# Patient Record
Sex: MALE | Race: WHITE
[De-identification: names, ages, dates, MRNs, and addresses within clinical notes are randomized per-mention and may not be internally consistent; named-entity substitution may affect disease eponyms.]

---

## 2017-06-11 ENCOUNTER — HOSPITAL ENCOUNTER (INPATIENT)
Dept: HOSPITAL 74 - JER | Age: 82
LOS: 2 days | Discharge: HOME | DRG: 313 | End: 2017-06-13
Attending: NURSE PRACTITIONER | Admitting: INTERNAL MEDICINE
Payer: COMMERCIAL

## 2017-06-11 VITALS — BODY MASS INDEX: 26.1 KG/M2

## 2017-06-11 DIAGNOSIS — I71.2: ICD-10-CM

## 2017-06-11 DIAGNOSIS — D69.6: ICD-10-CM

## 2017-06-11 DIAGNOSIS — M19.90: ICD-10-CM

## 2017-06-11 DIAGNOSIS — C91.10: ICD-10-CM

## 2017-06-11 DIAGNOSIS — K25.9: ICD-10-CM

## 2017-06-11 DIAGNOSIS — D64.9: ICD-10-CM

## 2017-06-11 DIAGNOSIS — I08.2: ICD-10-CM

## 2017-06-11 DIAGNOSIS — N40.0: ICD-10-CM

## 2017-06-11 DIAGNOSIS — I48.2: ICD-10-CM

## 2017-06-11 DIAGNOSIS — Z85.46: ICD-10-CM

## 2017-06-11 DIAGNOSIS — R07.89: Primary | ICD-10-CM

## 2017-06-11 DIAGNOSIS — I10: ICD-10-CM

## 2017-06-11 DIAGNOSIS — I25.10: ICD-10-CM

## 2017-06-11 DIAGNOSIS — I45.10: ICD-10-CM

## 2017-06-11 LAB
ALBUMIN SERPL-MCNC: 3 G/DL (ref 3.4–5)
ALP SERPL-CCNC: 48 U/L (ref 45–117)
ALT SERPL-CCNC: 12 U/L (ref 12–78)
ANION GAP SERPL CALC-SCNC: 6 MMOL/L (ref 8–16)
APTT BLD: 35.3 SECONDS (ref 26.9–34.4)
AST SERPL-CCNC: 10 U/L (ref 15–37)
BASOPHILS # BLD: 0.4 % (ref 0–2)
BILIRUB SERPL-MCNC: 0.4 MG/DL (ref 0.2–1)
CALCIUM SERPL-MCNC: 8.3 MG/DL (ref 8.5–10.1)
CO2 SERPL-SCNC: 29 MMOL/L (ref 21–32)
COCKROFT - GAULT: 53.41
CREAT SERPL-MCNC: 1 MG/DL (ref 0.7–1.3)
DEPRECATED RDW RBC AUTO: 18.2 % (ref 11.9–15.9)
EOSINOPHIL # BLD: 0.5 % (ref 0–4.5)
GLUCOSE SERPL-MCNC: 100 MG/DL (ref 74–106)
INR BLD: 1.2 (ref 0.82–1.09)
MAGNESIUM SERPL-MCNC: 2.2 MG/DL (ref 1.8–2.4)
MCH RBC QN AUTO: 29.5 PG (ref 25.7–33.7)
MCHC RBC AUTO-ENTMCNC: 31.6 G/DL (ref 32–35.9)
MCV RBC: 93.3 FL (ref 80–96)
NEUTROPHILS # BLD: 64.5 % (ref 42.8–82.8)
PLATELET # BLD AUTO: 63 K/MM3 (ref 134–434)
PLATELET # BLD EST: (no result) 10*3/UL
PLATELET COMMENT2: (no result)
PLATELET COMMENT3: (no result)
PMV BLD: 12.3 FL (ref 7.5–11.1)
PROT SERPL-MCNC: 8 G/DL (ref 6.4–8.2)
PT PNL PPP: 13.3 SEC (ref 9.98–11.88)
TROPONIN I SERPL-MCNC: < 0.02 NG/ML (ref 0–0.05)
WBC # BLD AUTO: 5.1 K/MM3 (ref 4–10)

## 2017-06-11 NOTE — PDOC
History of Present Illness





- General


History Source: Patient, Family, Old Records


Exam Limitations: No Limitations





<Regis Olivares - Last Filed: 06/11/17 18:54>





- General


History Source: Patient, Old Records


Exam Limitations: No Limitations





- History of Present Illness


Initial Comments: 


06/11/17 18:46


The patient is an 86-year-old man, accompanied by his wife and his son, with a 

significant past medical history of lymphoplasmacytic lymphoma/CLL status post 

Rituxan with no response(now on Imbruvica BID), atrial fibrillation (not on 

anticoagulants due to severe thrombocytopenia due to bone marrow involvement of 

LPL/CLL, benign prostate hyperplasia, prostate Ca (status post seed implants 

approximately 12 years ago), coronary artery disease, osteoarthritis who 

presents to the emergency department via walk-in for chest pain. No fall, 

recent strenuous activity. He states he was in his usual state of health when 

he started to experience left sided chest pain, 2 days ago. He is unable to 

describe the nature of the pain but notes that his pain is non-radiating, 

constant with a rated 5/10 in severity and associated with shortness of breath 

and palpitations. He does not provide any exacerbating/alleviating factors, as 

he notes that exertional activity versus nonexertional does not change the 

severity of his pain.  He presents today for evaluation.


 


As per old charts, patient underwent an echocardiogram on 12/11/2015 which 

revealed moderate dilatation of the left atrium and severe dilatation of the 

right atrium, mild mitral annular calcification, mild tricuspid regurgitation, 

mild aortic stenosis, mild to moderate aortic regurgitation, mildly dilated 

ascending aorta, mild left ventricular hypertrophy and moderate dilatation of 

the right ventricle.


 


He denies fever, chills, cough, hemoptysis, diaphoresis, headache.


He denies jaw/ back pain, lower extremity pain/swelling, calf tenderness/pain


He denies abdominal pain, nausea, vomiting, diarrhea.





Allergies: Cefepime


Past Surgical History: Hernia repair. Seeds implant.


Social History: No tobacco, EtOH and recreational drug use.


Primary Care Physician: Dr. Ronni Bryant


Cardiologist: Patient states he saw Dr. Rivas Silvestre approximately 2 

years ago


Oncologist/Hematologist: Dr. Roland Day





<Damaris Dennis - Last Filed: 06/11/17 19:01>





<Juany Nguyễn - Last Filed: 06/11/17 23:47>





- General


Chief Complaint: Chest Pain


Stated Complaint: CHEST PAIN


Time Seen by Provider: 06/11/17 18:06





Past History





- Past Medical History


Anemia: No


Asthma: No


Cancer: Yes (LYMPHOMA)


Cardiac Disorders: Yes (A-fib)


CVA: No


CHF: No


Dementia: No


Diabetes: No


GI Disorders: No


 Disorders: Yes (Enlarged Prostate)


HTN: Yes


Hypercholesterolemia: No


Liver Disease: No


Seizures: No


Thyroid Disease: No





- Surgical History


Abdominal Surgery: Yes (HERNIA REPAIR)


Appendectomy: No


Cardiac Surgery: No


Cholecystectomy: No


Lung Surgery: No


Neurologic Surgery: No


Orthopedic Surgery: No





- Psycho/Social/Smoking Cessation Hx


Anxiety: No


Suicidal Ideation: No


Smoking History: Never smoked


Have you smoked in the past 12 months: No


Number of Cigarettes Smoked Daily: 10


If you are a former smoker, when did you quit?: 50 YRS AGO


Information on smoking cessation initiated: No


Hx Alcohol Use: No


Drug/Substance Use Hx: No


Substance Use Type: None


Hx Substance Use Treatment: No





<Regis Olivares - Last Filed: 06/11/17 18:54>





<Damaris Dennis - Last Filed: 06/11/17 19:01>





<Juany Nguyễn - Last Filed: 06/11/17 23:47>





- Past Medical History


Allergies/Adverse Reactions: 


 Allergies











Allergy/AdvReac Type Severity Reaction Status Date / Time


 


cefepime Allergy   Verified 06/11/17 18:10











Home Medications: 


Ambulatory Orders





Pantoprazole Sodium [Protonix -] 40 mg PO DAILY 10/17/15 


Valacyclovir HCl [Valtrex -] 500 mg PO HS 11/10/15 


Ibrutinib [Imbruvica] 280 mg PO HS 06/11/17 


Tamsulosin HCl [Flomax] 0.4 mg PO HS 06/11/17 











**Review of Systems





- Review of Systems


Able to Perform ROS?: Yes


Comments:: 


06/11/17 18:46


GENERAL/CONSTITUTIONAL: No fever or chills. No weakness.


HEAD, EYES, EARS, NOSE AND THROAT: No change in vision. No ear pain or 

discharge. No sore throat.


CARDIOVASCULAR: Yes: Chest Pain. Shortness of Breath. Palpitations. 


RESPIRATORY: No cough, wheezing, or hemoptysis.


GASTROINTESTINAL: No nausea, vomiting, diarrhea or constipation.


GENITOURINARY: No dysuria, frequency, or change in urination.


MUSCULOSKELETAL: No joint or muscle swelling or pain. No neck or back pain.


SKIN: No rash


NEUROLOGIC: No headache, vertigo, loss of consciousness, or change in strength/

sensation.


ENDOCRINE: No increased thirst. No abnormal weight change.


HEMATOLOGIC/LYMPHATIC: Yes: See HPI.


ALLERGIC/IMMUNOLOGIC: No hives or skin allergy.





<Damaris Dennis - Last Filed: 06/11/17 19:01>





*Physical Exam





- Vital Signs


 Last Vital Signs











Temp Pulse Resp BP Pulse Ox


 


 97.0 F L  82   18   142/79   100 


 


 06/11/17 18:04  06/11/17 18:04  06/11/17 18:04  06/11/17 18:04  06/11/17 18:04














<Regis Olivares - Last Filed: 06/11/17 18:54>





- Vital Signs


 Last Vital Signs











Temp Pulse Resp BP Pulse Ox


 


 97.0 F L  82   18   142/79   100 


 


 06/11/17 18:04  06/11/17 18:04  06/11/17 18:04  06/11/17 18:04  06/11/17 18:04














- Physical Exam


Comments: 


06/11/17 18:46


GENERAL: Awake, alert, and fully oriented, in no acute distress 


HEAD: No signs of trauma


EYES: PERRLA, EOMI, sclera anicteric, conjunctiva clear


ENT: Auricles normal inspection, hearing grossly normal, nares patent, 

oropharynx clear without exudates. Moist mucosa


NECK: Normal ROM, supple, no lymphadenopathy, JVD, or masses


LUNGS: Rales appreciated approximately half way up the left lung. Rales also 

appreciated at the base of the right lung.


HEART:  Irregularly, irregular rate and rhythm. No murmurs, rubs or gallops


ABDOMEN: Soft, nontender, normoactive bowel sounds.  No guarding, no rebound.  

No masses


EXTREMITIES: Normal range of motion, no edema.  No clubbing or cyanosis. No 

cords, erythema, or tenderness


NEUROLOGICAL: Cranial nerves II through XII grossly intact.  Normal speech, 

normal gait








<Damaris Dennis - Last Filed: 06/11/17 19:01>





- Vital Signs


 Last Vital Signs











Temp Pulse Resp BP Pulse Ox


 


 97.0 F L  82   18   142/79   100 


 


 06/11/17 18:04  06/11/17 18:04  06/11/17 18:04  06/11/17 18:04  06/11/17 18:04














<Juany Nguyễn - Last Filed: 06/11/17 23:47>





**Heart Score/ECG Review





- History


History: Moderately suspicious





- Electrocardiogram


EKG: Non specific repolarization disturbance





- Age


Age: >/= 65





- Risk Factors


Based on the list above the patient has:: 1-2 risk factors


  ** #1


ECG reviewed & interpreted by me at: 18:10





06/11/17 18:37


afib 132, RBBB,  msec, no GATO





<Regis Olivares - Last Filed: 06/11/17 18:54>





ED Treatment Course





- LABORATORY


CBC & Chemistry Diagram: 


 06/11/17 18:40





 06/11/17 18:40





- RADIOLOGY


Radiology Studies Ordered: 














 Category Date Time Status


 


 CHEST CTA [CT] Stat CT Scan  06/11/17 18:24 Ordered














<Regis Olivares - Last Filed: 06/11/17 18:54>





- LABORATORY


CBC & Chemistry Diagram: 


 06/11/17 18:40





 06/11/17 18:40





<Damaris Dennis - Last Filed: 06/11/17 19:01>





- LABORATORY


CBC & Chemistry Diagram: 


 06/11/17 19:00





 06/11/17 19:00





- ADDITIONAL ORDERS


Additional order review: 


 Laboratory  Results











  06/11/17 06/11/17





  19:00 19:00


 


INR   1.20 H


 


PTT (Actin FS)   35.3 H


 


Sodium  137 


 


Potassium  4.5 


 


Chloride  102 


 


Carbon Dioxide  29 


 


Anion Gap  6 L 


 


BUN  19 H 


 


Creatinine  1.0 


 


Creat Clearance w eGFR  > 60 


 


Random Glucose  100 


 


Calcium  8.3 L 


 


Magnesium  2.2  D 


 


Total Bilirubin  0.4 


 


AST  10 L D 


 


ALT  12  D 


 


Alkaline Phosphatase  48 


 


Creatine Kinase  27 L 


 


Troponin I  < 0.02 


 


B-Natriuretic Peptide  552.36 H 


 


Total Protein  8.0  D 


 


Albumin  3.0 L 








 











  06/11/17





  19:00


 


RBC  3.15 L


 


MCV  93.3


 


MCHC  31.6 L


 


RDW  18.2 H D


 


MPV  12.3 H


 


Neutrophils %  64.5


 


Lymphocytes %  23.3


 


Monocytes %  11.3 H


 


Eosinophils %  0.5  D


 


Basophils %  0.4














- Medications


Given in the ED: 


ED Medications














Discontinued Medications














Generic Name Dose Route Start Last Admin





  Trade Name Arjun  PRN Reason Stop Dose Admin


 


Aspirin  324 mg 06/11/17 18:38 06/11/17 19:11





  Asa -  PO 06/11/17 18:39  324 mg





  ONCE ONE   Administration














<Juany Nguyễn - Last Filed: 06/11/17 23:47>





Medical Decision Making





- Medical Decision Making


06/11/17 18:36





A portion of this note was documented by scribe services under my direction. I 

have reviewed the details of the note, within reason, and agree with the 

documentation with the following case summary and management plan written by 

me. 





Patient treated in the ED.





Nursing notes are reviewed and incorporated into the medical decision-making.


Vital signs reviewed.





Peripheral IV access obtained by the nurse, laboratory studies are drawn and 

sent, reviewed and interpreted by myself. 





 Vital Signs











Temp Pulse Resp BP Pulse Ox


 


 97.0 F L  82   18   142/79   100 


 


 06/11/17 18:04  06/11/17 18:04  06/11/17 18:04  06/11/17 18:04  06/11/17 18:04








86 year old male pmh of afib (not on anticoagulation), LISSA lymphoma, HTN, BPH, 

distant prostate ca, HLD presents with 3 days of chest pain. The patient has 

persistent difficult to describe left-sided chest pain with no radiation or 

shortness of breath. The patient reports that he's been adherent to his 

medications including oral chemotherapy that he takes daily. Denies fevers or 

chills.





Differential includes myocardial infarction, acute coronary syndrome, pulmonary 

embolism, left pleural effusion. Regardless, patient should be admitted for 

further evaluation. Labs, CTA of chest to r/o PE, troponin.





06/11/17 18:54


Case signed out to oncoming ED attending Dr. Nguyễn for further management and 

disposition.





<Regis Olivares - Last Filed: 06/11/17 18:54>





- Medical Decision Making





06/11/17 23:39


I received pt on signout and his CT was finally read now.  He has an aortic 

aneurysm. No PE; just pleural effusions:





Patient Name: Kareem Josue THIS IS A PRELIMINARY REPORT FROM IMAGING ON CALL

  EXAM: CTA chest  IMAGES: [id number]  EXAM DATE AND TIME: 2017-06-11 20:29:15.0  

REASON FOR EXAM: Chest pain. Rule out PE. History of lymphoma  COMPARISON: Not 

provided  FINDINGS: There is no evidence of pulmonary embolism  The thoracic 

aorta is tortuous with atherosclerotic calcifications. There is aneurysmal 

dilatation of the ascending aorta measuring 4.3 x 4.2 cm in greatest transverse 

dimension. No evidence of dissection  The heart is upper limits of normal size 

to mildly enlarged. There are coronary artery and aortic annular 

calcifications. Trace pericardial effusion  There are enlarged prevascular and 

paratracheal lymph nodes and enlarged bilateral hilar lymph nodes measuring up 

to 1 cm in short axis. These are nonspecific and possibly reactive although 

lymphoproliferative disease and malignant adenopathy cannot be excluded  The 

tracheobronchial tree is patent  Small bilateral pleural effusions with 

overlying atelectasis, slightly larger on the right side Pleural thickening at 

the lung apices No airspace consolidation or infiltrates  Few hepatic cysts 

measure up to 1.5 cm. Imaged portions of the upper abdomen are otherwise 

unremarkable THIS DOCUMENT HAS BEEN ELECTRONICALLY SIGNED





He will be admitted to the hospitalist





<Juany Nguyễn - Last Filed: 06/11/17 23:47>





*DC/Admit/Observation/Transfer





<Regis Olivares - Last Filed: 06/11/17 18:54>





- Attestations


Scribe Attestion: 


06/11/17 18:47


Documentation prepared by Damaris Dennis, acting as medical scribe for Regis Olivares MD.





<Damaris Dennis - Last Filed: 06/11/17 19:01>





- Discharge Dispostion


Admit: Yes





<Juany Nguyễn - Last Filed: 06/11/17 23:47>


Diagnosis at time of Disposition: 


 Chest pain, A-fib, CLL (chronic lymphocytic leukemia), Aortic aneurysm

## 2017-06-11 NOTE — HP
CHIEF COMPLAINT: Left Sided Chest Pain





PCP: Dr. Ronni Bryant


Card: Dr. Silvestre


Heme/Onc: Dr. Day





HISTORY OF PRESENT ILLNESS:


This is a pleasant 85 y/o man with a significant past medical history of: CLL/

Lymphoplasmacytic Lymphoma (on Imbru, Prostate Ca (seed implants), HTN, CAD,  

Afib ( no AC), Thrombocytopenia, OA, BPH, Gastric Ulcer. Who presents to the ED 

with L-sided chest pain x 2 days. Patient reports the pain as constant, non-

radiating. Patient denies fever, chills, cough, AP, N/V/D, constipation, 

dysuria. Patient's last Echo 12/15.





ER course was notable for:


(1) Cardiac Enzymes neg x1


(2) EKG-Afib rate controlled, RBBB


(3) H/H 9.3/29.4


(4) Platelets 63





Recent Travel: None





PAST MEDICAL HISTORY:


See HPI





PAST SURGICAL HISTORY:


Hernia Repair


Prostate (seed implant)





Social History:


Smoking: Never


Alcohol:  None


Drugs:   None


Lives with spouse, independent





Family History:


Cancer- Son





Allergies





cefepime Allergy (Verified 06/11/17 18:10)


 








HOME MEDICATIONS:


 Home Medications











 Medication  Instructions  Recorded


 


Pantoprazole Sodium [Protonix -] 40 mg PO DAILY 10/17/15


 


Valacyclovir HCl [Valtrex -] 500 mg PO HS 11/10/15


 


Ibrutinib [Imbruvica] 280 mg PO HS 06/11/17


 


Tamsulosin HCl [Flomax] 0.4 mg PO HS 06/11/17








REVIEW OF SYSTEMS


CONSTITUTIONAL: 


Absent:  fever, chills, diaphoresis, generalized weakness, malaise, loss of 

appetite, weight change


HEENT: 


Absent:  rhinorrhea, nasal congestion, throat pain, throat swelling, difficulty 

swallowing, mouth swelling, ear pain, eye pain, visual changes


CARDIOVASCULAR: chest pain


Absent: syncope, palpitations, irregular heart rate, lightheadedness, 

peripheral edema


RESPIRATORY: 


Absent: cough, shortness of breath, dyspnea with exertion, orthopnea, wheezing, 

stridor, hemoptysis


GASTROINTESTINAL:


Absent: abdominal pain, abdominal distension, nausea, vomiting, diarrhea, 

constipation, melena, hematochezia


GENITOURINARY: 


Absent: dysuria, frequency, urgency, hesitancy, hematuria, flank pain, genital 

pain


MUSCULOSKELETAL: 


Absent: myalgia, arthralgia, joint swelling, back pain, neck pain


SKIN: 


Absent: rash, itching, pallor


HEMATOLOGIC/IMMUNOLOGIC: 


Absent: easy bleeding, easy bruising, lymphadenopathy, frequent infections


ENDOCRINE:


Absent: unexplained weight gain, unexplained weight loss, heat intolerance, 

cold intolerance


NEUROLOGIC: 


Absent: headache, focal weakness or paresthesias, dizziness, unsteady gait, 

seizure, mental status changes, bladder or bowel incontinence


PSYCHIATRIC: 


Absent: anxiety, depression, suicidal or homicidal ideation, hallucinations.








PHYSICAL EXAMINATION


 Vital Signs - 24 hr











  06/11/17





  18:04


 


Temperature 97.0 F L


 


Pulse Rate 82


 


Respiratory 18





Rate 


 


Blood Pressure 142/79


 


O2 Sat by Pulse 100





Oximetry (%) 











GENERAL: Awake, alert, and fully oriented, in no acute distress.


HEAD: Normal with no signs of trauma.


EYES: Pupils equal, round and reactive to light, extraocular movements intact, 

sclera anicteric, conjunctiva clear. No lid lag.


EARS, NOSE, THROAT: Ears normal, nares patent, oropharynx clear without 

exudates. Moist mucous membranes.


NECK: Normal range of motion, supple without lymphadenopathy, JVD, or masses.


LUNGS: Breath sounds equal, clear to auscultation bilaterally. No wheezes, and 

no crackles. No accessory muscle use.


HEART: Irregular rate and rhythm, normal S1 and S2, systolic murmur. No rub or 

gallop. CP is non- reproducible to palpation


ABDOMEN: Soft, nontender, not distended, normoactive bowel sounds, no guarding, 

no rebound, no masses.  No hepatomegaly or  splenomegaly. 


MUSCULOSKELETAL: Normal range of motion at all joints. No bony deformities or 

tenderness. No CVA tenderness.


UPPER EXTREMITIES: 2+ pulses, warm, well-perfused. No cyanosis. No clubbing. No 

peripheral edema.


LOWER EXTREMITIES: 2+ pulses, warm, well-perfused. No calf tenderness. No 

peripheral edema. 


NEUROLOGICAL:  Cranial nerves II-XII intact. Normal speech. Gait not observed.


PSYCHIATRIC: Cooperative. Good eye contact. Appropriate mood and affect.


SKIN: Warm, dry, normal turgor, no rashes or lesions noted, normal capillary 

refill. 








 Laboratory Results - last 24 hr











  06/11/17 06/11/17 06/11/17





  19:00 19:00 19:00


 


WBC  5.1  


 


RBC  3.15 L  


 


Hgb  9.3 L D  


 


Hct  29.4 L D  


 


MCV  93.3  


 


MCHC  31.6 L  


 


RDW  18.2 H D  


 


Plt Count  63 L  


 


MPV  12.3 H  


 


Neutrophils %  64.5  


 


Lymphocytes %  23.3  


 


Monocytes %  11.3 H  


 


Eosinophils %  0.5  D  


 


Basophils %  0.4  


 


Differential Comment  Slide scanned  


 


Platelet Estimate  Mod decreased  


 


Platelet Comment  Few giant plts  


 


INR   1.20 H 


 


PTT (Actin FS)   35.3 H 


 


Sodium    137


 


Potassium    4.5


 


Chloride    102


 


Carbon Dioxide    29


 


Anion Gap    6 L


 


BUN    19 H


 


Creatinine    1.0


 


Creat Clearance w eGFR    > 60


 


Random Glucose    100


 


Calcium    8.3 L


 


Magnesium    2.2  D


 


Total Bilirubin    0.4


 


AST    10 L D


 


ALT    12  D


 


Alkaline Phosphatase    48


 


Creatine Kinase    27 L


 


Troponin I    < 0.02


 


B-Natriuretic Peptide    552.36 H


 


Total Protein    8.0  D


 


Albumin    3.0 L











ASSESSMENT/PLAN:


This is a 85 y/o male with a PMHx of: CLL, Afib (no AC), HTN, CAD, Prostate CA, 

BPH, OA, Gastric Ulcer. Admitted to Telemetry for Chest Pain r/o MI, 

Thrombocytopenia, Anemia for further evaluation of their emergent condition.





Plan:





1. Chest Pain r/o MI


- Tele monitoring


- HEART Score 4


- Asa given in ED


- Serial Enzymes, neg x1


- Appreciate Cardiology Consult


- EKG reviewed no change compared to prior study


- Chest Xray- reviewed


- Echo in am


- Last Echo 2 yrs ago showed- EF 78% moderate dilation LA, severe dilation RA, 

mild TR, AS, mild-mod AR, mild dilated ascending aorta, LVSF normal, RV, mod 

dilated, RV systolic function normal


- Lipid Profile am


- HgbA1C


- Continue Baby Asa closely monitor 





2. Afib


- Rate Controlled


- Tele monitoring


- [id number]  Score 3


- Patient not on ACs secondary to risks outweigh the benefits, for severe 

Thrombocytopenia given the CLL hx


- EKG reviewed





3. Chronic Lymphocytic Leukemia


- Continue Imbruvica, (NF), patient may use his home, when reviewed by pharmacy


- f/u with Oncology outpatient





4. Thrombocytopenia


- Likely secondary to CLL


- Will continue to monitor and treat accordingly





5. Hypertension


- Controlled


- Monitor BP, treat accordingly


- Currently not on meds





6. BPH


- Continue Flomax





7. Gastric Ulcer


- Continue Protonix





8. Osteoarthritis


- Tylenol prn





9. Prostate Ca


- s/p seed implant x9 yrs ago





10. FEN


- Tolerates PO Fluids


- Replete lytes prn


- Low Na Diet





11. DVT Prophylaxis


- OOB


- SCDs


- Hold Ac 2/2 Thrombocytopenia





Code Status: Full Code





Dispo: Requires Inpatient Care











Problem List





- Problem


(1) Atrial fibrillation, chronic


Code(s): I48.2 - CHRONIC ATRIAL FIBRILLATION





(2) CLL (chronic lymphocytic leukemia)


Code(s): C91.10 - CHRONIC LYMPHOCYTIC LEUK OF B-CELL TYPE NOT ACHIEVE REMIS





(3) Chest pain


Code(s): R07.9 - CHEST PAIN, UNSPECIFIED   





(4) DVT prophylaxis


Code(s): VRL5316 - 





(5) Hypertension


Code(s): I10 - ESSENTIAL (PRIMARY) HYPERTENSION   Qualifiers: 


     Hypertension type: essential hypertension     Qualified Code(s): I10 - 

Essential (primary) hypertension  








Visit type





- Emergency Visit


Emergency Visit: Yes


ED Registration Date: 06/11/17


Care time: The patient presented to the Emergency Department on the above date 

and was hospitalized for further evaluation of their emergent condition.





- New Patient


This patient is new to me today: Yes


Date on this admission: 06/11/17





- Critical Care


Critical Care patient: No

## 2017-06-12 LAB
ANION GAP SERPL CALC-SCNC: 6 MMOL/L (ref 8–16)
BASOPHILS # BLD: 0.6 % (ref 0–2)
CALCIUM SERPL-MCNC: 8.5 MG/DL (ref 8.5–10.1)
CHOLEST SERPL-MCNC: 91 MG/DL (ref 50–200)
CO2 SERPL-SCNC: 30 MMOL/L (ref 21–32)
COCKROFT - GAULT: 59.34
CREAT SERPL-MCNC: 0.9 MG/DL (ref 0.7–1.3)
DEPRECATED RDW RBC AUTO: 17.6 % (ref 11.9–15.9)
EOSINOPHIL # BLD: 0.6 % (ref 0–4.5)
GLUCOSE SERPL-MCNC: 84 MG/DL (ref 74–106)
LDLC SERPL CALC-MCNC: 40 MG/DL (ref 5–100)
MAGNESIUM SERPL-MCNC: 2.3 MG/DL (ref 1.8–2.4)
MCH RBC QN AUTO: 29.7 PG (ref 25.7–33.7)
MCHC RBC AUTO-ENTMCNC: 31.7 G/DL (ref 32–35.9)
MCV RBC: 93.6 FL (ref 80–96)
NEUTROPHILS # BLD: 61.1 % (ref 42.8–82.8)
PHOSPHATE SERPL-MCNC: 3.6 MG/DL (ref 2.5–4.9)
PLATELET # BLD AUTO: 74 K/MM3 (ref 134–434)
PMV BLD: 12.5 FL (ref 7.5–11.1)
TROPONIN I SERPL-MCNC: < 0.02 NG/ML (ref 0–0.05)
TROPONIN I SERPL-MCNC: < 0.02 NG/ML (ref 0–0.05)
WBC # BLD AUTO: 5.7 K/MM3 (ref 4–10)

## 2017-06-12 RX ADMIN — ASPIRIN 81 MG SCH MG: 81 TABLET ORAL at 10:20

## 2017-06-12 RX ADMIN — PANTOPRAZOLE SODIUM SCH MG: 40 TABLET, DELAYED RELEASE ORAL at 10:20

## 2017-06-12 NOTE — CON.CARD
Consult


Consult Specialty:: Cardiology





- History of Present Illness


History of Present Illness: 


The patient is an 86-year-old man, accompanied by his wife and his son, with a 

significant past medical history of lymphoplasmacytic lymphoma/CLL status post 

Rituxan with no response(now on Imbruvica BID), atrial fibrillation (not on 

anticoagulants due to severe thrombocytopenia due to bone marrow involvement of 

LPL/CLL, benign prostate hyperplasia, prostate Ca (status post seed implants 

approximately 12 years ago), coronary artery disease, osteoarthritis who 

presents to the emergency department via walk-in for chest pain. No fall, 

recent strenuous activity. He states he was in his usual state of health when 

he started to experience left sided chest pain, 2 days ago. He is unable to 

describe the nature of the pain but notes that his pain is non-radiating, 

constant with a rated 5/10 in severity and associated with shortness of breath 

and palpitations. He does not provide any exacerbating/alleviating factors, as 

he notes that exertional activity versus nonexertional does not change the 

severity of his pain.  He presents today for evaluation.


 


As per old charts, patient underwent an echocardiogram on 12/11/2015 which 

revealed moderate dilatation of the left atrium and severe dilatation of the 

right atrium, mild mitral annular calcification, mild tricuspid regurgitation, 

mild aortic stenosis, mild to moderate aortic regurgitation, mildly dilated 

ascending aorta, mild left ventricular hypertrophy and moderate dilatation of 

the right ventricle.


 


He denies fever, chills, cough, hemoptysis, diaphoresis, headache.


He denies jaw/ back pain, lower extremity pain/swelling, calf tenderness/pain


He denies abdominal pain, nausea, vomiting, diarrhea.





Allergies: Cefepime


Past Surgical History: Hernia repair. Seeds implant.


Social History: No tobacco, EtOH and recreational drug use.


Primary Care Physician: Dr. Ronni Bryant


Cardiologist: Patient states he saw Dr. Rivas Silvestre approximately 2 

years ago


Oncologist/Hematologist: Dr. Roland Day








[facility]





Atrial fibrillation             


   Hypertension             


   Lymphoma       Dr. Day       


   [facility]    





- History Source


History Provided By: Patient





- Past Medical History


Cardio/Vascular: Yes: AFIB (chronic), HTN


Pulmonary: Yes: Other (?describes history of asbestosis. )


Gastrointestinal: Yes: Other


Renal/: Yes: BPH





- Alcohol/Substance Use


Hx Alcohol Use: No


History of Substance Use: reports: None





- Smoking History


Smoking history: Never smoked


Have you smoked in the past 12 months: No


Aproximately how many cigarettes per day: 10


If you are a former smoker, when did you quit?: 50 YRS AGO





- Social History


Usual Living Arrangement: With Spouse


ADL: Independent


History of Recent Travel: No





Home Medications





- Allergies


Allergies/Adverse Reactions: 


 Allergies











Allergy/AdvReac Type Severity Reaction Status Date / Time


 


cefepime Allergy   Verified 06/11/17 18:10














- Home Medications


Home Medications: 


Ambulatory Orders





Pantoprazole Sodium [Protonix -] 40 mg PO DAILY 10/17/15 


Valacyclovir HCl [Valtrex -] 500 mg PO HS 11/10/15 


Ibrutinib [Imbruvica] 280 mg PO HS 06/11/17 


Tamsulosin HCl [Flomax] 0.4 mg PO HS 06/11/17 











Review of Systems





- Review of Systems


Constitutional: reports: No Symptoms


Eyes: reports: No Symptoms


HENT: reports: No Symptoms


Neck: reports: No Symptoms


Cardiovascular: reports: Chest Pain


Gastrointestinal: reports: No Symptoms


Genitourinary: reports: No Symptoms


Breasts: reports: No Symptoms Reported


Musculoskeletal: reports: No Symptoms


Integumentary: reports: No Symptoms


Neurological: reports: No Symptoms


Endocrine: reports: No Symptoms


Hematology/Lymphatic: reports: No Symptoms


Psychiatric: reports: No Symptoms


Vital Signs: 


 Vital Signs











Temperature  97.0 F L  06/11/17 18:04


 


Pulse Rate  75   06/12/17 12:04


 


Respiratory Rate  18   06/12/17 07:28


 


Blood Pressure  151/75   06/12/17 12:04


 


O2 Sat by Pulse Oximetry (%)  98   06/12/17 12:04











Constitutional: Yes: Well Nourished, No Distress, Calm


Eyes: Yes: WNL, Conjunctiva Clear, EOM Intact


HENT: Yes: WNL, Atraumatic, Normocephalic


Neck: Yes: WNL, Supple, Trachea Midline


Respiratory: Yes: WNL, Regular, CTA Bilaterally


Gastrointestinal: Yes: WNL, Normal Bowel Sounds


Renal/: Yes: WNL


Cardiovascular: Yes: WNL, Regular Rate and Rhythm


Musculoskeletal: Yes: WNL


Extremities: Yes: WNL


Integumentary: Yes: WNL


Neurological: Yes: WNL, Alert, Oriented


...Motor Strength: WNL


Psychiatric: Yes: WNL, Alert, Oriented





- Other Data


Labs, Other Data: 


 CBC, BMP





 06/12/17 06:00 





 06/12/17 06:35 





 INR, PTT











INR  1.20  (0.82-1.09)  H  06/11/17  19:00    








 Troponin, BNP











  06/12/17 06/12/17





  01:15 06:35


 


Troponin I  < 0.02  < 0.02








 Troponin, BNP











  06/12/17 06/12/17





  01:15 06:35


 


Troponin I  < 0.02  < 0.02














Imaging





- Results


Chest X-ray: Pending, Image Reviewed


Cat Scan: Report Reviewed (4.4 ascending aortic aneuysm)


EKG: Image Reviewed (af RBBB)





Problem List





- Problems


(1) A-fib


Code(s): I48.91 - UNSPECIFIED ATRIAL FIBRILLATION   





(2) Abscess or cellulitis of chest wall


Code(s): SOL0068 - 





(3) Acute blood loss anemia


Code(s): D62 - ACUTE POSTHEMORRHAGIC ANEMIA





(4) Admission for blood transfusion, without reported diagnosis


Code(s): Z51.89 - ENCOUNTER FOR OTHER SPECIFIED AFTERCARE





(5) Amaurosis fugax


Code(s): G45.3 - AMAUROSIS FUGAX





(6) Anemia


Code(s): D64.9 - ANEMIA, UNSPECIFIED   





(7) Aortic aneurysm


Code(s): I71.9 - AORTIC ANEURYSM OF UNSPECIFIED SITE, WITHOUT RUPTURE   





(8) Atrial fibrillation, chronic


Code(s): I48.2 - CHRONIC ATRIAL FIBRILLATION





(9) CHF exacerbation


Code(s): I50.9 - HEART FAILURE, UNSPECIFIED   





(10) CLL (chronic lymphocytic leukemia)


Code(s): C91.10 - CHRONIC LYMPHOCYTIC LEUK OF B-CELL TYPE NOT ACHIEVE REMIS





(11) Chest pain


Code(s): R07.9 - CHEST PAIN, UNSPECIFIED   





(12) DVT prophylaxis


Code(s): [id number] - 





(13) Decreased vision


Code(s): H54.7 - UNSPECIFIED VISUAL LOSS





(14) Headache


Code(s): R51 - HEADACHE   Qualifiers: 


     Headache type: unspecified     Headache chronicity pattern: acute headache

     Intractability: not intractable     Qualified Code(s): R51 - Headache  





(15) Hypertension


Code(s): I10 - ESSENTIAL (PRIMARY) HYPERTENSION   Qualifiers: 


     Hypertension type: essential hypertension     Qualified Code(s): I10 - 

Essential (primary) hypertension  





(16) Hyperviscosity syndrome


Code(s): [id number] - 





(17) Hyponatremia


Code(s): E87.1 - HYPO-OSMOLALITY AND HYPONATREMIA





(18) LVH (left ventricular hypertrophy)


Code(s): I51.7 - CARDIOMEGALY





(19) Leg pain, right


Code(s): M79.604 - PAIN IN RIGHT LEG





(20) Mucositis due to chemotherapy


Code(s): K12.31 - ORAL MUCOSITIS (ULCERATIVE) DUE TO ANTINEOPLASTIC THERAPY





(21) Nasal bleeding


Code(s): R04.0 - EPISTAXIS





(22) Nasal crusting


Code(s): J34.89 - OTHER SPECIFIED DISORDERS OF NOSE AND NASAL SINUSES





(23) Pancytopenia


Code(s): D61.818 - OTHER PANCYTOPENIA





(24) Pancytopenia, acquired


Code(s): D61.818 - OTHER PANCYTOPENIA





(25) Sebaceous cyst


Code(s): L72.3 - SEBACEOUS CYST





(26) Temporal arteritis syndrome


Code(s): M31.6 - OTHER GIANT CELL ARTERITIS





(27) Tremor


Code(s): R25.1 - TREMOR, UNSPECIFIED





(28) Back pain


Code(s): M54.9 - DORSALGIA, UNSPECIFIED   Qualifiers: 


     Back pain location: low back pain     Back pain laterality: right     

Sciatica presence: without sciatica     Qualified Code(s): M54.5 - Low back 

pain  





(29) Elbow contusion


Code(s): S50.00XA - CONTUSION OF UNSPECIFIED ELBOW, INITIAL ENCOUNTER   





(30) GI bleed


Code(s): K92.2 - GASTROINTESTINAL HEMORRHAGE, UNSPECIFIED   





(31) Thrombocytopenia


Code(s): D69.6 - THROMBOCYTOPENIA, UNSPECIFIED








Assessment/Plan


Atypical CP


Atrial fibrillation             


Hypertension             


Lymphoma       


RBBB


anemia


thoracic aortic aneurysm 





plan





telemetry


r/o mi


f/u echo results

## 2017-06-12 NOTE — EKG
Test Reason : 

Blood Pressure : ***/*** mmHG

Vent. Rate : 086 BPM     Atrial Rate : 087 BPM

   P-R Int : 000 ms          QRS Dur : 132 ms

    QT Int : 376 ms       P-R-T Axes : 056 093 016 degrees

   QTc Int : 449 ms

 

ATRIAL FIBRILLATION

RIGHT BUNDLE BRANCH BLOCK

ABNORMAL ECG

WHEN COMPARED WITH ECG OF 13-APR-2016 19:04,

NO SIGNIFICANT CHANGE WAS FOUND

Confirmed by SHAYE MARIE MD ([id number]) on 6/12/2017 2:07:22 PM

 

Referred By:             Confirmed By:SHAYE MARIE MD

## 2017-06-12 NOTE — PN
Physical Exam: 


SUBJECTIVE: Patient seen and examined in the ER.


He denies chest pain during assessment, states he was gardening and may have 

had lifted heavy items.





OBJECTIVE:


 Vital Signs











 Period  Temp  Pulse  Resp  BP Sys/Garcia  Pulse Ox


 


 Last 24 Hr    66-83  18-18  129-151/75-90  96-99











GENERAL: The patient is awake, alert, and fully oriented, in no acute distress.


HEAD: Normal with no signs of trauma.


EYES: PERRL, extraocular movements intact, sclera anicteric, conjunctiva clear. 

No ptosis. 


ENT: Ears normal, nares patent, oropharynx clear without exudates, moist mucous 

membranes.


NECK: Trachea midline, full range of motion, supple. 


LUNGS: Breath sounds equal, clear to auscultation bilaterally, no wheezes, no 

crackles, no accessory muscle use. 


ABDOMEN: Soft, nontender, nondistended, normoactive bowel sounds, no guarding, 

no 


rebound, no hepatosplenomegaly, no masses.


EXTREMITIES: 2+ pulses, warm, well-perfused, no edema. 


NEUROLOGICAL:  Normal speech, gait not observed.


PSYCH: Normal mood, normal affect.


SKIN: Warm, dry, normal turgor, no rashes or lesions noted


 Laboratory Results - last 24 hr











  06/12/17 06/12/17 06/12/17





  01:15 06:00 06:30


 


WBC   5.7 


 


RBC   3.13 L 


 


Hgb   9.3 L 


 


Hct   29.3 L 


 


MCV   93.6 


 


MCHC   31.7 L 


 


RDW   17.6 H 


 


Plt Count   74 L 


 


MPV   12.5 H 


 


Neutrophils %   61.1 


 


Lymphocytes %   28.0  D 


 


Monocytes %   9.7 


 


Eosinophils %   0.6 


 


Basophils %   0.6 


 


Sodium   


 


Potassium   


 


Chloride   


 


Carbon Dioxide   


 


Anion Gap   


 


BUN   


 


Creatinine   


 


Random Glucose   


 


Hemoglobin A1c %    5.4


 


Calcium   


 


Phosphorus   


 


Magnesium   


 


Creatine Kinase  26 L  


 


Troponin I  < 0.02  


 


Triglycerides   


 


Cholesterol   


 


Total LDL Cholesterol   


 


HDL Cholesterol   














  06/12/17 06/12/17





  06:35 06:35


 


WBC  


 


RBC  


 


Hgb  


 


Hct  


 


MCV  


 


MCHC  


 


RDW  


 


Plt Count  


 


MPV  


 


Neutrophils %  


 


Lymphocytes %  


 


Monocytes %  


 


Eosinophils %  


 


Basophils %  


 


Sodium  137 


 


Potassium  4.1 


 


Chloride  101 


 


Carbon Dioxide  30 


 


Anion Gap  6 L 


 


BUN  17 


 


Creatinine  0.9 


 


Random Glucose  84 


 


Hemoglobin A1c %  


 


Calcium  8.5 


 


Phosphorus  3.6 


 


Magnesium  2.3 


 


Creatine Kinase   31 L


 


Troponin I   < 0.02


 


Triglycerides  38 


 


Cholesterol  91 


 


Total LDL Cholesterol  40 


 


HDL Cholesterol  46  D 








Active Medications











Generic Name Dose Route Start Last Admin





  Trade Name Freq  PRN Reason Stop Dose Admin


 


Acetaminophen  650 mg 06/12/17 08:27  





  Tylenol -  PO   





  Q6H PRN   





  FEVER OR PAIN   


 


Aspirin  81 mg 06/12/17 10:00 06/12/17 10:20





  Asa -  PO   81 mg





  DAILY DORA   Administration


 


Morphine Sulfate  0.5 mg 06/12/17 08:28  





  Morphine Injection -  IVPB   





  Q4H PRN   





  PAIN   


 


Non-Formulary Medication  280 mg 06/12/17 22:00  





  Ibrutinib [Imbruvica]  PO   





  HS DORA   


 


Pantoprazole Sodium  40 mg 06/12/17 10:00 06/12/17 10:20





  Protonix -  PO   40 mg





  DAILY DORA   Administration


 


Tamsulosin HCl  0.4 mg 06/12/17 22:00  





  Flomax -  PO   





  HS DORA   


 


Valacyclovir HCl  500 mg 06/12/17 22:00  





  Valtrex -  PO 06/13/17 21:59  





  HS [facility]   











ASSESSMENT/PLAN:





Patient is is a 87 y/o male with a past medical history of CLL, Atrial 

fibrillation (not on any anticoagulants), hypertension, CAD, prostate cancer, 

BPH and gastric ulcer.  He was admitted on 6/11/2017 for chest pain to rule out 

ACS.  He was also noted to have thrombocytopenia and anemia on admission. 





Cardiology:


Chest Pain r/o ACS


Assessment/Plan: On telemonitoring


Denies chest pain on exam


ASA 324mg given in the ER, now on ASA 81mg standing


Trops negative x 3


Cardiology following


Echo pending


Lipid panel reviewed


Monitor on Tele





Atrial Fibrillation


Assessment/Plan: rate controlled


Continue tele monitoring


Not on any anticoagulation secondary to chronic thrombocytopenia 





Hypertension


Assessment/Plan:  Monitor BP


On no cardiac meds, BP slightly elevated


Monitor and if still elevated, will start on Norvasc 5mg


Cardiology following





Oncology:


CLL 


Assessment/Plan: On Imbruvica


Oncology follow up on discharge





Hematology:


Thrombocytopenia - chronic


Assessment/Plan: Likely secondary to CLL





F.E.N.


Fluids: tolerating PO


Electrolytes: monitor with morning bmp


Nutrition: low sodium





Prophylaxis:


DVT: no AC, secondary to thrombocytopenia


GI: deferred





Disposition:  Requires inpatient observation.  Full Code.

## 2017-06-13 VITALS — TEMPERATURE: 98.2 F | HEART RATE: 80 BPM | DIASTOLIC BLOOD PRESSURE: 90 MMHG | SYSTOLIC BLOOD PRESSURE: 148 MMHG

## 2017-06-13 LAB
ALBUMIN SERPL-MCNC: 2.6 G/DL (ref 3.4–5)
ALP SERPL-CCNC: 45 U/L (ref 45–117)
ALT SERPL-CCNC: 14 U/L (ref 12–78)
ANION GAP SERPL CALC-SCNC: 8 MMOL/L (ref 8–16)
AST SERPL-CCNC: 16 U/L (ref 15–37)
BASOPHILS # BLD: 0.7 % (ref 0–2)
BILIRUB SERPL-MCNC: 0.6 MG/DL (ref 0.2–1)
CALCIUM SERPL-MCNC: 8.2 MG/DL (ref 8.5–10.1)
CO2 SERPL-SCNC: 29 MMOL/L (ref 21–32)
COCKROFT - GAULT: 59.34
CREAT SERPL-MCNC: 0.9 MG/DL (ref 0.7–1.3)
DEPRECATED RDW RBC AUTO: 17.8 % (ref 11.9–15.9)
EOSINOPHIL # BLD: 0.7 % (ref 0–4.5)
GLUCOSE SERPL-MCNC: 98 MG/DL (ref 74–106)
MCH RBC QN AUTO: 29.9 PG (ref 25.7–33.7)
MCHC RBC AUTO-ENTMCNC: 32.3 G/DL (ref 32–35.9)
MCV RBC: 92.8 FL (ref 80–96)
NEUTROPHILS # BLD: 56.6 % (ref 42.8–82.8)
PLATELET # BLD AUTO: 63 K/MM3 (ref 134–434)
PMV BLD: 12.1 FL (ref 7.5–11.1)
PROT SERPL-MCNC: 7.4 G/DL (ref 6.4–8.2)
WBC # BLD AUTO: 4.9 K/MM3 (ref 4–10)

## 2017-06-13 RX ADMIN — ASPIRIN 81 MG SCH MG: 81 TABLET ORAL at 10:20

## 2017-06-13 RX ADMIN — PANTOPRAZOLE SODIUM SCH MG: 40 TABLET, DELAYED RELEASE ORAL at 10:20

## 2017-06-13 NOTE — PN
Progress Note, Physician


History of Present Illness: 


The patient is an 86-year-old man, accompanied by his wife and his son, with a 

significant past medical history of lymphoplasmacytic lymphoma/CLL status post 

Rituxan with no response(now on Imbruvica BID), atrial fibrillation (not on 

anticoagulants due to severe thrombocytopenia due to bone marrow involvement of 

LPL/CLL, benign prostate hyperplasia, prostate Ca (status post seed implants 

approximately 12 years ago), coronary artery disease, osteoarthritis who 

presents to the emergency department via walk-in for chest pain. No fall, 

recent strenuous activity. He states he was in his usual state of health when 

he started to experience left sided chest pain, 2 days ago. He is unable to 

describe the nature of the pain but notes that his pain is non-radiating, 

constant with a rated 5/10 in severity and associated with shortness of breath 

and palpitations. He does not provide any exacerbating/alleviating factors, as 

he notes that exertional activity versus nonexertional does not change the 

severity of his pain.  He presents today for evaluation.


 


As per old charts, patient underwent an echocardiogram on 12/11/2015 which 

revealed moderate dilatation of the left atrium and severe dilatation of the 

right atrium, mild mitral annular calcification, mild tricuspid regurgitation, 

mild aortic stenosis, mild to moderate aortic regurgitation, mildly dilated 

ascending aorta, mild left ventricular hypertrophy and moderate dilatation of 

the right ventricle.


 


He denies fever, chills, cough, hemoptysis, diaphoresis, headache.


He denies jaw/ back pain, lower extremity pain/swelling, calf tenderness/pain


He denies abdominal pain, nausea, vomiting, diarrhea.





Allergies: Cefepime


Past Surgical History: Hernia repair. Seeds implant.


Social History: No tobacco, EtOH and recreational drug use.


Primary Care Physician: Dr. Ronni Bryant


Cardiologist: Patient states he saw Dr. Rivas Silvestre approximately 2 

years ago


Oncologist/Hematologist: Dr. Roland Day








[facility]





Atrial fibrillation             


   Hypertension             


   Lymphoma       Dr. Day       


   [facility]    





- Current Medication List


Current Medications: 


Active Medications





Acetaminophen (Tylenol -)  650 mg PO Q6H PRN


   PRN Reason: FEVER OR PAIN


Aspirin (Asa -)  81 mg PO DAILY DORA


   Last Admin: 06/12/17 10:20 Dose:  81 mg


Morphine Sulfate (Morphine Injection -)  0.5 mg IVPB Q4H PRN


   PRN Reason: PAIN


Non-Formulary Medication (Ibrutinib [Imbruvica])  280 mg PO HS [facility]


Pantoprazole Sodium (Protonix -)  40 mg PO DAILY [facility]


   Last Admin: 06/12/17 10:20 Dose:  40 mg


Tamsulosin HCl (Flomax -)  0.4 mg PO HS [facility]


   Last Admin: 06/12/17 22:26 Dose:  Not Given


Valacyclovir HCl (Valtrex -)  500 mg PO [facility]


   Stop: 06/13/17 21:59


   Last Admin: 06/12/17 22:26 Dose:  Not Given











- Objective


Vital Signs: 


 Vital Signs











Temperature  97.8 F   06/13/17 06:00


 


Pulse Rate  86   06/13/17 06:00


 


Respiratory Rate  18   06/13/17 06:00


 


Blood Pressure  110/67   06/13/17 06:00


 


O2 Sat by Pulse Oximetry (%)  96   06/12/17 21:00











Eyes: Yes: WNL, Conjunctiva Clear, EOM Intact


HENT: Yes: WNL, Atraumatic, Normocephalic


Neck: Yes: WNL, Supple, Trachea Midline


Cardiovascular: Yes: WNL, Pulse Irregular, Murmur, S1, S2


Respiratory: Yes: WNL, Regular, CTA Bilaterally


Gastrointestinal: Yes: WNL, Normal Bowel Sounds


Genitourinary: Yes: WNL


Musculoskeletal: Yes: WNL


Extremities: Yes: WNL


Edema: No


Integumentary: Yes: WNL


Neurological: Yes: WNL, Alert, Oriented


...Motor Strength: WNL


Psychiatric: Yes: WNL


Labs: 


 CBC, BMP





 06/13/17 05:35 





 06/13/17 05:35 





 INR, PTT











INR  1.20  (0.82-1.09)  H  06/11/17  19:00    














Problem List





- Problems


(1) A-fib


Code(s): I48.91 - UNSPECIFIED ATRIAL FIBRILLATION   





(2) Abscess or cellulitis of chest wall


Code(s): [id number] - 





(3) Acute blood loss anemia


Code(s): D62 - ACUTE POSTHEMORRHAGIC ANEMIA





(4) Admission for blood transfusion, without reported diagnosis


Code(s): Z51.89 - ENCOUNTER FOR OTHER SPECIFIED AFTERCARE





(5) Amaurosis fugax


Code(s): G45.3 - AMAUROSIS FUGAX





(6) Anemia


Code(s): D64.9 - ANEMIA, UNSPECIFIED   





(7) Aortic aneurysm


Code(s): I71.9 - AORTIC ANEURYSM OF UNSPECIFIED SITE, WITHOUT RUPTURE   





(8) Atrial fibrillation, chronic


Code(s): I48.2 - CHRONIC ATRIAL FIBRILLATION





(9) CHF exacerbation


Code(s): I50.9 - HEART FAILURE, UNSPECIFIED   





(10) CLL (chronic lymphocytic leukemia)


Code(s): C91.10 - CHRONIC LYMPHOCYTIC LEUK OF B-CELL TYPE NOT ACHIEVE REMIS





(11) Chest pain


Code(s): R07.9 - CHEST PAIN, UNSPECIFIED   





(12) DVT prophylaxis


Code(s): [id number] - 





(13) Decreased vision


Code(s): H54.7 - UNSPECIFIED VISUAL LOSS





(14) Headache


Code(s): R51 - HEADACHE   Qualifiers: 


     Headache type: unspecified     Headache chronicity pattern: acute headache

     Intractability: not intractable     Qualified Code(s): R51 - Headache  





(15) Hypertension


Code(s): I10 - ESSENTIAL (PRIMARY) HYPERTENSION   Qualifiers: 


     Hypertension type: essential hypertension     Qualified Code(s): I10 - 

Essential (primary) hypertension  





(16) Hyperviscosity syndrome


Code(s): [id number] - 





(17) Hyponatremia


Code(s): E87.1 - HYPO-OSMOLALITY AND HYPONATREMIA





(18) LVH (left ventricular hypertrophy)


Code(s): I51.7 - CARDIOMEGALY





(19) Leg pain, right


Code(s): M79.604 - PAIN IN RIGHT LEG





(20) Mucositis due to chemotherapy


Code(s): K12.31 - ORAL MUCOSITIS (ULCERATIVE) DUE TO ANTINEOPLASTIC THERAPY





(21) Nasal bleeding


Code(s): R04.0 - EPISTAXIS





(22) Nasal crusting


Code(s): J34.89 - OTHER SPECIFIED DISORDERS OF NOSE AND NASAL SINUSES





(23) Pancytopenia


Code(s): D61.818 - OTHER PANCYTOPENIA





(24) Pancytopenia, acquired


Code(s): D61.818 - OTHER PANCYTOPENIA





(25) Sebaceous cyst


Code(s): L72.3 - SEBACEOUS CYST





(26) Temporal arteritis syndrome


Code(s): M31.6 - OTHER GIANT CELL ARTERITIS





(27) Tremor


Code(s): R25.1 - TREMOR, UNSPECIFIED





(28) Back pain


Code(s): M54.9 - DORSALGIA, UNSPECIFIED   Qualifiers: 


     Back pain location: low back pain     Back pain laterality: right     

Sciatica presence: without sciatica     Qualified Code(s): M54.5 - Low back 

pain  





(29) Elbow contusion


Code(s): S50.00XA - CONTUSION OF UNSPECIFIED ELBOW, INITIAL ENCOUNTER   





(30) GI bleed


Code(s): K92.2 - GASTROINTESTINAL HEMORRHAGE, UNSPECIFIED   





(31) Thrombocytopenia


Code(s): D69.6 - THROMBOCYTOPENIA, UNSPECIFIED








Assessment/Plan


Atypical CP


Atrial fibrillation             


Hypertension             


Lymphoma       


RBBB


anemia


thoracic aortic aneurysm 


moderate to severe AS mean gradient 25 mmHG





plan


cardiac wise stable


d/c telemetry


further w/u as an outpatient

## 2017-06-13 NOTE — DS
Physical Exam: 


SUBJECTIVE: Patient seen and examined.  He denies chest pain, denies shortness 

of breath.


States he feels well, slept well and denies chest pain since admission. 





OBJECTIVE:





 Vital Signs











 Period  Temp  Pulse  Resp  BP Sys/Garcia  Pulse Ox


 


 Last 24 Hr  97.8 F-98.6 F  76-86  18-20  110-148/63-90  96-96








PHYSICAL EXAM





GENERAL: The patient is awake, alert, and fully oriented, in no acute distress.


HEAD: Normal with no signs of trauma.


EYES: PERRL, extraocular movements intact, sclera anicteric, conjunctiva clear. 

No ptosis. 


ENT: Ears normal, nares patent, oropharynx clear without exudates, moist mucous 

membranes.


NECK: Trachea midline, full range of motion, supple. 


LUNGS: Breath sounds equal, clear to auscultation bilaterally, no wheezes, no 

crackles, no accessory muscle use. 


ABDOMEN: Soft, nontender, nondistended, normoactive bowel sounds, no guarding, 

no 


rebound, no hepatosplenomegaly, no masses.


EXTREMITIES: 2+ pulses, warm, well-perfused, no edema. 


NEUROLOGICAL:  Normal speech, gait not observed.


PSYCH: Normal mood, normal affect.


SKIN: Warm, dry, normal turgor, no rashes or lesions noted





LABS


 Laboratory Results - last 24 hr











  06/13/17 06/13/17





  05:35 05:35


 


WBC  4.9 


 


RBC  2.99 L 


 


Hgb  8.9 L 


 


Hct  27.7 L 


 


MCV  92.8 


 


MCHC  32.3 


 


RDW  17.8 H 


 


Plt Count  63 L 


 


MPV  12.1 H 


 


Neutrophils %  56.6 


 


Lymphocytes %  31.6 


 


Monocytes %  10.4 H 


 


Eosinophils %  0.7 


 


Basophils %  0.7 


 


Sodium   137


 


Potassium   3.7


 


Chloride   100


 


Carbon Dioxide   29


 


Anion Gap   8


 


BUN   20 H


 


Creatinine   0.9


 


Creat Clearance w eGFR   > 60


 


Random Glucose   98


 


Calcium   8.2 L


 


Total Bilirubin   0.6  D


 


AST   16  D


 


ALT   14


 


Alkaline Phosphatase   45


 


Total Protein   7.4


 


Albumin   2.6 L











HOSPITAL COURSE:





Date of Admission:06/11/17





Date of Discharge: 06/13/17








Patient is is a 87 y/o male with a past medical history of CLL, Atrial 

fibrillation (not on any anticoagulants), hypertension, CAD, prostate cancer, 

BPH and gastric ulcer.  He was admitted on 6/11/2017 for chest pain to rule out 

ACS.  He was also noted to have thrombocytopenia and anemia on admission. 





Cardiology:


Chest Pain r/o ACS - resolved


Assessment/Plan: Denies chest pain on exam, denies shortness of breath


ASA 324mg given in the ER, now on ASA 81mg standing


Trops negative x 3, cardiology cleared for discharge


Patient to see cardiologist for further workup as an outpatient


Echo reviewed


Lipid panel reviewed


EKG shows atrial fib 87 with no significant changes since EKG 4/13/2016





Atrial Fibrillation - chronic


Assessment/Plan: rate controlled


Continue tele monitoring


Not on any anticoagulation secondary to chronic thrombocytopenia 


Patient refusing to start on ASA 81mg





Hypertension - chronic


Assessment/Plan:  Monitor BP


On no cardiac meds, BP better controlled


Cardiology follow up as an outpatient





Oncology:


Chronic Lymph. Leukemia


Assessment/Plan: On Imbruvica


Oncology follow up on discharge with Dr. Day





Hematology:


Thrombocytopenia - chronic


Assessment/Plan: Likely secondary to CLL





Disposition:  Discharge with close cardiology follow up.  Patient in agreement 

to follow up with his cardiologist.  


Minutes to complete discharge: 60





Discharge Summary


Reason For Visit: CHRONIC LYMPHOCYTIC LEUKEMIA,AORTIC ANEURYSM,ATRIA


Current Active Problems





A-fib (Acute) 


Abscess or cellulitis of chest wall (Acute) 


Acute blood loss anemia (Acute) 


Admission for blood transfusion, without reported diagnosis (Acute) 


Amaurosis fugax (Acute) 


Anemia (Acute) 


Aortic aneurysm (Acute) 


Atrial fibrillation, chronic (Acute) 


CHF exacerbation (Acute) 


CLL (chronic lymphocytic leukemia) (Acute) 


Chest pain (Acute) 


DVT prophylaxis (Acute) 


Decreased vision (Acute) 


Headache (Acute) 


Hypertension (Acute) 


Hyperviscosity syndrome (Acute) 


Hyponatremia (Acute) 


LVH (left ventricular hypertrophy) (Acute) 


Leg pain, right (Acute) 


Mucositis due to chemotherapy (Acute) 


Nasal bleeding (Acute) 


Nasal crusting (Acute) 


Pancytopenia (Acute) 


Pancytopenia, acquired (Acute) 


Sebaceous cyst (Acute) 


Temporal arteritis syndrome (Acute) 


Tremor (Acute) 








Condition: Stable





- Instructions


Diet, Activity, Other Instructions: 


Mr. Josue:





Please follow up with your cardiologist within 1 week after discharge for 

further cardiac workup.





Please see Dr. Day as scheduled for your monthly visits.





Please return to the ER with any new or persistent symptoms. 





Please call me if you have any questions.





Natasha Johnson NP


[phone]


Referrals: 


Rivas Silvestre MD [Staff Physician] - 


Roland Day MD [Staff Physician] - 


Disposition: HOME





- Home Medications


Comprehensive Discharge Medication List: 


Ambulatory Orders





Pantoprazole Sodium [Protonix -] 40 mg PO DAILY 10/17/15 


Valacyclovir HCl [Valtrex -] 500 mg PO HS 11/10/15 


Ibrutinib [Imbruvica] 280 mg PO HS 06/11/17 


Tamsulosin HCl [Flomax] 0.4 mg PO HS 06/11/17 


Aspirin [ASA -] 81 mg PO DAILY  tab.chew 06/13/17 








This patient is new to me today: Yes


Date on this admission: 06/13/17


Emergency Visit: Yes


ED Registration Date: 06/11/17


Care time: The patient presented to the Emergency Department on the above date 

and was hospitalized for further evaluation of their emergent condition.


Critical Care patient: No





- Discharge Referral


Referred to [facility] No

## 2017-12-15 ENCOUNTER — HOSPITAL ENCOUNTER (OUTPATIENT)
Dept: HOSPITAL 74 - JONCBLOOD | Age: 82
Discharge: HOME | End: 2017-12-15
Attending: INTERNAL MEDICINE
Payer: COMMERCIAL

## 2017-12-15 VITALS — TEMPERATURE: 98.1 F | DIASTOLIC BLOOD PRESSURE: 79 MMHG | HEART RATE: 87 BPM | SYSTOLIC BLOOD PRESSURE: 156 MMHG

## 2017-12-15 DIAGNOSIS — D63.0: ICD-10-CM

## 2017-12-15 DIAGNOSIS — C91.10: Primary | ICD-10-CM

## 2017-12-15 LAB
ALBUMIN SERPL-MCNC: 3 G/DL (ref 3.4–5)
ALP SERPL-CCNC: 57 U/L (ref 45–117)
ALT SERPL-CCNC: 14 U/L (ref 12–78)
ANION GAP SERPL CALC-SCNC: 7 MMOL/L (ref 8–16)
AST SERPL-CCNC: 7 U/L (ref 15–37)
BILIRUB CONJ SERPL-MCNC: < 0.2 MG/DL (ref 0–0.2)
BILIRUB SERPL-MCNC: 0.3 MG/DL (ref 0.2–1)
CALCIUM SERPL-MCNC: 8.6 MG/DL (ref 8.5–10.1)
CO2 SERPL-SCNC: 28 MMOL/L (ref 21–32)
CREAT SERPL-MCNC: 1 MG/DL (ref 0.7–1.3)
DEPRECATED RDW RBC AUTO: 19.3 % (ref 11.9–15.9)
GLUCOSE SERPL-MCNC: 104 MG/DL (ref 74–106)
MCH RBC QN AUTO: 24 PG (ref 25.7–33.7)
MCHC RBC AUTO-ENTMCNC: 29.7 G/DL (ref 32–35.9)
MCV RBC: 81 FL (ref 80–96)
PLATELET # BLD AUTO: 66 K/MM3 (ref 134–434)
PMV BLD: 11 FL (ref 7.5–11.1)
PROT SERPL-MCNC: 7.7 G/DL (ref 6.4–8.2)
WBC # BLD AUTO: 4 K/MM3 (ref 4–10)

## 2017-12-15 PROCEDURE — P9038 RBC IRRADIATED: HCPCS

## 2017-12-15 PROCEDURE — 30233N1 TRANSFUSION OF NONAUTOLOGOUS RED BLOOD CELLS INTO PERIPHERAL VEIN, PERCUTANEOUS APPROACH: ICD-10-PCS | Performed by: INTERNAL MEDICINE

## 2017-12-15 PROCEDURE — P9058 RBC, L/R, CMV-NEG, IRRAD: HCPCS

## 2017-12-15 NOTE — HP
Satellite [facility]





- Chief Complaint


Chief Complaint: Here for elective blood transfusion.  Denies fever/chills/cough

/SOB/abdominal pain/nausea/vomiting/diarrhea





- Past Medical History


Allergies/Adverse Reactions: 


 Allergies











Allergy/AdvReac Type Severity Reaction Status Date / Time


 


cefepime Allergy   Verified 06/11/17 18:10











Cardiovascular: Yes: AFIB (chronic), HTN


Pulmonary: Yes: Other (?describes history of asbestosis. )


Gastrointestinal: Yes: Other


Renal/: Yes: BPH


Heme/Onc: Yes: Other (CLL, Lymphoma)





- Current Medications


Current Medications: 


 Home Medications











 Medication  Instructions  Recorded


 


Pantoprazole Sodium [Protonix -] 40 mg PO DAILY 10/17/15


 


Valacyclovir HCl [Valtrex -] 500 mg PO HS 11/10/15


 


Ibrutinib [Imbruvica] 280 mg PO HS 06/11/17


 


Tamsulosin HCl [Flomax] 0.4 mg PO HS 06/11/17


 


Aspirin [ASA -] 81 mg PO DAILY  tab.chew 06/13/17














Satellite Physical Exam





- Physical Examination


Vital Signs: 


 Vital Signs











 Period  Temp  Pulse  Resp  BP Sys/Garcia  Pulse Ox


 


 Last 24 Hr  97.7 F-98.2 F  72-72  14-18  149-151/66-67  











General Appearance: Well Nourished


Lung: Clear to auscultation


Heart: Regular rate & rhythm, Normal S1, Normal S2


Abdomen: Soft, No tenderness, Normal bowel sounds


Neurological: Intact





Satellite Impression/Plan





- Impression/Plan


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  Here for elective 

blood transfusion.  asymptomatic.  lasix inbetween transfuisions.  Potasium 

chloride

## 2018-01-18 ENCOUNTER — HOSPITAL ENCOUNTER (OUTPATIENT)
Dept: HOSPITAL 74 - JONCBLOOD | Age: 83
Discharge: HOME | End: 2018-01-18
Attending: INTERNAL MEDICINE
Payer: COMMERCIAL

## 2018-01-18 VITALS — DIASTOLIC BLOOD PRESSURE: 94 MMHG | HEART RATE: 72 BPM | SYSTOLIC BLOOD PRESSURE: 156 MMHG | TEMPERATURE: 97.4 F

## 2018-01-18 DIAGNOSIS — C91.10: Primary | ICD-10-CM

## 2018-01-18 DIAGNOSIS — D63.0: ICD-10-CM

## 2018-01-18 LAB
ALBUMIN SERPL-MCNC: 3.1 G/DL (ref 3.4–5)
ALP SERPL-CCNC: 68 U/L (ref 45–117)
ALT SERPL-CCNC: 15 U/L (ref 12–78)
ANION GAP SERPL CALC-SCNC: 7 MMOL/L (ref 8–16)
AST SERPL-CCNC: 13 U/L (ref 15–37)
BILIRUB SERPL-MCNC: 0.4 MG/DL (ref 0.2–1)
BUN SERPL-MCNC: 20 MG/DL (ref 7–18)
CALCIUM SERPL-MCNC: 8.1 MG/DL (ref 8.5–10.1)
CHLORIDE SERPL-SCNC: 101 MMOL/L (ref 98–107)
CO2 SERPL-SCNC: 29 MMOL/L (ref 21–32)
CREAT SERPL-MCNC: 0.9 MG/DL (ref 0.7–1.3)
DEPRECATED RDW RBC AUTO: 21.3 % (ref 11.9–15.9)
DEPRECATED RDW RBC AUTO: 21.5 % (ref 11.9–15.9)
GLUCOSE SERPL-MCNC: 141 MG/DL (ref 74–106)
HCT VFR BLD CALC: 29.2 % (ref 35.4–49)
HCT VFR BLD CALC: 31.3 % (ref 35.4–49)
HGB BLD-MCNC: 9.1 GM/DL (ref 11.7–16.9)
HGB BLD-MCNC: 9.5 GM/DL (ref 11.7–16.9)
MCH RBC QN AUTO: 24.7 PG (ref 25.7–33.7)
MCH RBC QN AUTO: 25.4 PG (ref 25.7–33.7)
MCHC RBC AUTO-ENTMCNC: 30.2 G/DL (ref 32–35.9)
MCHC RBC AUTO-ENTMCNC: 31.3 G/DL (ref 32–35.9)
MCV RBC: 81.1 FL (ref 80–96)
MCV RBC: 81.7 FL (ref 80–96)
PLATELET # BLD AUTO: 159 K/MM3 (ref 134–434)
PLATELET # BLD AUTO: 99 K/MM3 (ref 134–434)
PMV BLD: 10.2 FL (ref 7.5–11.1)
PMV BLD: 9.5 FL (ref 7.5–11.1)
POTASSIUM SERPLBLD-SCNC: 3.8 MMOL/L (ref 3.5–5.1)
PROT SERPL-MCNC: 8.1 G/DL (ref 6.4–8.2)
RBC # BLD AUTO: 3.6 M/MM3 (ref 4–5.6)
RBC # BLD AUTO: 3.84 M/MM3 (ref 4–5.6)
SODIUM SERPL-SCNC: 137 MMOL/L (ref 136–145)
WBC # BLD AUTO: 4.8 K/MM3 (ref 4–10)
WBC # BLD AUTO: 5 K/MM3 (ref 4–10)

## 2018-01-18 PROCEDURE — 30233R1 TRANSFUSION OF NONAUTOLOGOUS PLATELETS INTO PERIPHERAL VEIN, PERCUTANEOUS APPROACH: ICD-10-PCS | Performed by: INTERNAL MEDICINE

## 2018-01-18 PROCEDURE — P9038 RBC IRRADIATED: HCPCS

## 2018-01-18 PROCEDURE — P9034 PLATELETS, PHERESIS: HCPCS

## 2018-02-05 ENCOUNTER — HOSPITAL ENCOUNTER (OUTPATIENT)
Dept: HOSPITAL 74 - JER | Age: 83
Setting detail: OBSERVATION
LOS: 3 days | Discharge: HOME | End: 2018-02-08
Attending: FAMILY MEDICINE | Admitting: FAMILY MEDICINE
Payer: COMMERCIAL

## 2018-02-05 VITALS — BODY MASS INDEX: 26.1 KG/M2

## 2018-02-05 DIAGNOSIS — I25.10: ICD-10-CM

## 2018-02-05 DIAGNOSIS — D64.9: Primary | ICD-10-CM

## 2018-02-05 DIAGNOSIS — D69.6: ICD-10-CM

## 2018-02-05 DIAGNOSIS — C91.10: ICD-10-CM

## 2018-02-05 DIAGNOSIS — R05: ICD-10-CM

## 2018-02-05 DIAGNOSIS — Z88.8: ICD-10-CM

## 2018-02-05 DIAGNOSIS — I10: ICD-10-CM

## 2018-02-05 DIAGNOSIS — Z85.46: ICD-10-CM

## 2018-02-05 DIAGNOSIS — N40.0: ICD-10-CM

## 2018-02-05 DIAGNOSIS — M19.90: ICD-10-CM

## 2018-02-05 DIAGNOSIS — I48.2: ICD-10-CM

## 2018-02-05 DIAGNOSIS — J18.9: ICD-10-CM

## 2018-02-05 LAB
ALBUMIN SERPL-MCNC: 2.9 G/DL (ref 3.4–5)
ALP SERPL-CCNC: 56 U/L (ref 45–117)
ALT SERPL-CCNC: 11 U/L (ref 12–78)
ANION GAP SERPL CALC-SCNC: 6 MMOL/L (ref 8–16)
AST SERPL-CCNC: 10 U/L (ref 15–37)
BASOPHILS # BLD: 1 % (ref 0–2)
BILIRUB DIRECT SERPL-MCNC: 131 U/L (ref 87–241)
BILIRUB SERPL-MCNC: 0.4 MG/DL (ref 0.2–1)
BUN SERPL-MCNC: 19 MG/DL (ref 7–18)
CALCIUM SERPL-MCNC: 8.3 MG/DL (ref 8.5–10.1)
CHLORIDE SERPL-SCNC: 102 MMOL/L (ref 98–107)
CO2 SERPL-SCNC: 27 MMOL/L (ref 21–32)
CREAT SERPL-MCNC: 1 MG/DL (ref 0.7–1.3)
DEPRECATED RDW RBC AUTO: 22 % (ref 11.9–15.9)
EOSINOPHIL # BLD: 0.5 % (ref 0–4.5)
GLUCOSE SERPL-MCNC: 84 MG/DL (ref 74–106)
HCT VFR BLD CALC: 26.2 % (ref 35.4–49)
HGB BLD-MCNC: 8 GM/DL (ref 11.7–16.9)
INR BLD: 1.17 (ref 0.82–1.09)
LYMPHOCYTES # BLD: 23.4 % (ref 8–40)
MCH RBC QN AUTO: 24.9 PG (ref 25.7–33.7)
MCHC RBC AUTO-ENTMCNC: 30.4 G/DL (ref 32–35.9)
MCV RBC: 82.1 FL (ref 80–96)
MONOCYTES # BLD AUTO: 7.7 % (ref 3.8–10.2)
NEUTROPHILS # BLD: 67.4 % (ref 42.8–82.8)
PLATELET # BLD AUTO: 59 K/MM3 (ref 134–434)
PMV BLD: 9.4 FL (ref 7.5–11.1)
POTASSIUM SERPLBLD-SCNC: 4 MMOL/L (ref 3.5–5.1)
PROT SERPL-MCNC: 9.3 G/DL (ref 6.4–8.2)
PT PNL PPP: 13.2 SEC (ref 9.98–11.88)
RBC # BLD AUTO: 3.2 M/MM3 (ref 4–5.6)
SODIUM SERPL-SCNC: 135 MMOL/L (ref 136–145)
WBC # BLD AUTO: 4.8 K/MM3 (ref 4–10)

## 2018-02-05 PROCEDURE — P9038 RBC IRRADIATED: HCPCS

## 2018-02-05 PROCEDURE — G0378 HOSPITAL OBSERVATION PER HR: HCPCS

## 2018-02-05 PROCEDURE — P9058 RBC, L/R, CMV-NEG, IRRAD: HCPCS

## 2018-02-05 NOTE — PN
Progress Note (short form)





- Note


Progress Note: 








Patient is a 86 year old male, with a significant past medical history of 

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

,  Afib ( no AC), Thrombocytopenia, OA, BPH, Gastric Ulcers who presents to the 

emergency department sent in by PCP for low hemoglobin level. Patient has been 

feeling increasingly tired and generally weak for the past 2-3 days. Patient 

additionally endorses dry cough however denies any fever,chills. Routine lab 

work at PCP office revealed low blood counts and was sent to the ED for blood 

transfusion. 





Note: Prior to arrival, patient had head CT at [facility] for routine head CT. 





Patient denies chest pain, headache or dizziness. 


Patient denies abdominal pain, nausea, vomit, diarrhea or constipation. 


Patient denies dysuria, frequency, urgency or hematuria. 


Patient denies sick contacts or recent travel. 





Allergies: Cefepime


Past surgical history:Hernia repair. Prostate seeds implant.








- Past Medical History


Cancer: Yes (LYMPHOMA)


Cardiac Disorders: Yes (A-fib)


 Disorders: Yes (Enlarged Prostate)


HTN: Yes








- Surgical History


Abdominal Surgery: Yes (HERNIA REPAIR)








- Suicide/Smoking/Psychosocial Hx


Smoking History: Never smoked








- Past Medical History


Allergies/Adverse Reactions: 


 Allergies











Allergy/AdvReac Type Severity Reaction Status Date / Time


 


cefepime Allergy   Verified 02/05/18 09:56











Home Medications: 


Ambulatory Orders





Amlodipine Besylate [Norvasc -] 5 mg PO DAILY 02/05/18 


Hydrochlorothiazide 12.5 mg PO DAILY 02/05/18 


Pantoprazole Sodium 40 mg PO DAILY 02/05/18 


Tamsulosin HCl 0.4 mg PO DAILY 02/05/18 

















*Physical Exam





- Vital Signs





 Last Vital Signs











Temp Pulse Resp BP Pulse Ox


 


 97.9 F   81   20   158/80   98 


 


 02/05/18 19:06  02/05/18 19:06  02/05/18 19:06  02/05/18 19:06  02/05/18 14:43











Cor: RSR, No murmurs, No gallops


Lungs: Clear to P&A


Abd: Soft, Normal bowel sounds, No organomegaly


Ext:No significant edema








 Abnormal Lab Results











  02/05/18 02/05/18 02/05/18





  09:40 09:40 09:40


 


RBC  3.20 L  


 


Hgb  8.0 L D  


 


Hct  26.2 L  


 


MCH  24.9 L  


 


MCHC  30.4 L  


 


RDW  22.0 H  


 


Plt Count  59 L D  


 


Retic Count   


 


PT with INR   13.20 H 


 


INR   1.17 H 


 


Sodium    135 L


 


Anion Gap    6 L


 


BUN    19 H


 


Calcium    8.3 L


 


AST    10 L D


 


ALT    11 L D


 


Total Protein    9.3 H


 


Albumin    2.9 L


 


Serum Folate   


 


Crossmatch   














  02/05/18 02/05/18 02/05/18





  09:40 11:16 13:15


 


RBC   


 


Hgb   


 


Hct   


 


MCH   


 


MCHC   


 


RDW   


 


Plt Count   


 


Retic Count   1.81 H D 


 


PT with INR   


 


INR   


 


Sodium   


 


Anion Gap   


 


BUN   


 


Calcium   


 


AST   


 


ALT   


 


Total Protein   


 


Albumin   


 


Serum Folate    25 H


 


Crossmatch  See Detail  








Active Medications











Generic Name Dose Route Start Last Admin





  Trade Name Freq  PRN Reason Stop Dose Admin


 


Amlodipine Besylate  5 mg  02/06/18 10:00  





  Norvasc -  PO   





  DAILY DORA   


 


Hydrochlorothiazide  12.5 mg  02/06/18 10:00  





  Hctz -  PO   





  DAILY [facility]   


 


Systane Eye Drops  1 each  02/05/18 22:00  





  Non-Formulary Med  OU   





  TID DORA   


 


Prednisolone Acetate  1 each  02/06/18 00:00  





1% Eye Drops Non-  OU   





Formulary Med  Q6H [facility]   


 


Alphagan P 1%  1 each  02/05/18 22:00  





Eyedrops Non-  OU   





Formulary Med  BID [facility]   


 


Pantoprazole Sodium  40 mg  02/06/18 10:00  





  Protonix -  PO   





  DAILY [facility]   


 


Tamsulosin HCl  0.4 mg  02/06/18 08:30  





  Flomax -  PO   





  DAILY@0830 DORA   








A/P





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 

worsening cytopenias


Platelets 37810


Hgb 8





symptomatic anemia--transfused PRBCs





? cytopenias-- ? ibrutinib related--on 280mg daily


? disease progression


? occult infection


Monitor CBC closely


may need to hold ibrutinib based on CBC


check SFLCA

## 2018-02-05 NOTE — PDOC
History of Present Illness





<BessYvonne - Last Filed: 02/05/18 11:19>





- General


History Source: Patient


Exam Limitations: No Limitations





- History of Present Illness


Initial Comments: 





02/05/18 10:15


The patient is a 86 year old male, with a significant past medical history of 

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

, CAD,  Afib ( no AC), Thrombocytopenia, OA, BPH, Gastric Ulcers who presents 

to the emergency department sent in by PCP for low hemoglobin level. Patient 

has been feeling increasingly tired and generally weak for the past 2-3 days. 

Patient additionally endorses dry cough however denies any fever,chills. 

Routine lab work at PCP office revealed low blood counts and was sent to the ED 

for blood transfusion. 





Note: Prior to arrival, patient had head CT at [facility] for routine head CT. 





Patient denies any previous history of anemia or blood transfusion.  


Patient denies chest pain, headache or dizziness. 


Patient denies abdominal pain, nausea, vomit, diarrhea or constipation. 


Patient denies dysuria, frequency, urgency or hematuria. 


Patient denies sick contacts or recent travel. 





Allergies: Cefepime


Past surgical history:Hernia repair. Prostate seeds implant.


Social history: None


PCP:  Yifan


Onco: Shay








<Morena Elias - Last Filed: 02/05/18 11:27>





- General


Chief Complaint: Blood Transfusion


Stated Complaint: PCP SENT FOR ADMIN


Time Seen by Provider: 02/05/18 09:21





Past History





- Past Medical History


Anemia: No


Asthma: No


Cancer: Yes (LYMPHOMA)


Cardiac Disorders: Yes (A-fib)


CVA: No


COPD: No


CHF: No


Dementia: No


Diabetes: No


GI Disorders: No


 Disorders: Yes (Enlarged Prostate)


HTN: Yes


Hypercholesterolemia: No


Liver Disease: No


Seizures: No


Thyroid Disease: No





- Surgical History


Abdominal Surgery: Yes (HERNIA REPAIR)


Appendectomy: No


Cardiac Surgery: No


Cholecystectomy: No


Lung Surgery: No


Neurologic Surgery: No


Orthopedic Surgery: No





- Suicide/Smoking/Psychosocial Hx


Smoking History: Never smoked


Have you smoked in the past 12 months: No


Number of Cigarettes Smoked Daily: 10


If you are a former smoker, when did you quit?: 50 YRS AGO


Information on smoking cessation initiated: No


Hx Alcohol Use: No


Drug/Substance Use Hx: No


Substance Use Type: None


Hx Substance Use Treatment: No





<Yvonne Bess - Last Filed: 02/05/18 11:19>





<Morena Elias - Last Filed: 02/05/18 11:27>





- Past Medical History


Allergies/Adverse Reactions: 


 Allergies











Allergy/AdvReac Type Severity Reaction Status Date / Time


 


cefepime Allergy   Verified 02/05/18 09:56











Home Medications: 


Ambulatory Orders





Amlodipine Besylate [Norvasc -] 5 mg PO DAILY 02/05/18 


Hydrochlorothiazide 12.5 mg PO DAILY 02/05/18 


Pantoprazole Sodium 40 mg PO DAILY 02/05/18 


Tamsulosin HCl 0.4 mg PO DAILY 02/05/18 











**Review of Systems





- Review of Systems


Able to Perform ROS?: Yes


Comments:: 





02/05/18 10:15





GENERAL/CONSTITUTIONAL: No fever or chills. +Generalized weakness.


HEAD, EYES, EARS, NOSE AND THROAT: No change in vision. No ear pain or 

discharge. No sore throat.


GASTROINTESTINAL: No nausea, vomiting, diarrhea or constipation.


GENITOURINARY: No dysuria, frequency, or change in urination.


CARDIOVASCULAR: No chest pain or shortness of breath.


RESPIRATORY: + cough. No wheezing, or hemoptysis.


MUSCULOSKELETAL: No joint or muscle swelling or pain. No neck or back pain.


SKIN: No rash


NEUROLOGIC: No headache, vertigo, loss of consciousness, or change in strength/

sensation.


ENDOCRINE: No increased thirst. No abnormal weight change.


HEMATOLOGIC/LYMPHATIC: No anemia, easy bleeding, or history of blood clots.


ALLERGIC/IMMUNOLOGIC: No hives or skin allergy.














<Morena Elias - Last Filed: 02/05/18 11:27>





*Physical Exam





- Vital Signs


 Last Vital Signs











Temp Pulse Resp BP Pulse Ox


 


 97.6 F   83   18   144/65   97 


 


 02/05/18 08:46  02/05/18 08:46  02/05/18 08:46  02/05/18 08:46  02/05/18 08:46














- Physical Exam


Comments: 





GENERAL: Awake, alert, and fully oriented, in no acute distress


HEAD: No signs of trauma


EYES: PERRLA, EOMI, sclera anicteric, conjunctiva clear. +Small ecchymosis to R 

lower eyelid (c/w history of recent biopsy)


ENT: Auricles normal inspection, hearing grossly normal, nares patent, 

oropharynx clear without exudates. Moist mucosa


NECK: Normal ROM, supple, no lymphadenopathy, JVD, or masses


LUNGS: Breath sounds equal, clear to auscultation bilaterally.  No wheezes, and 

no crackles


HEART: Regular rate and rhythm, normal S1 and S2, no murmurs, rubs or gallops


ABDOMEN: Soft, nontender, normoactive bowel sounds.  No guarding, no rebound.  

No masses


EXTREMITIES: Normal range of motion, no edema.  No clubbing or cyanosis. No 

cords, erythema, or tenderness


NEUROLOGICAL: Cranial nerves II through XII grossly intact.  Normal speech, 

normal gait


SKIN: Warm, Dry, normal turgor, no rashes or lesions noted.











<Yvonne Bess - Last Filed: 02/05/18 11:19>





- Vital Signs


 Last Vital Signs











Temp Pulse Resp BP Pulse Ox


 


 97.6 F   83   18   144/65   97 


 


 02/05/18 08:46  02/05/18 08:46  02/05/18 08:46  02/05/18 08:46  02/05/18 08:46














<Morena Elias - Last Filed: 02/05/18 11:27>





ED Treatment Course





- LABORATORY


CBC & Chemistry Diagram: 


 02/05/18 09:40





 02/05/18 09:40





<Yvonne Bess - Last Filed: 02/05/18 11:19>





- LABORATORY


CBC & Chemistry Diagram: 


 02/05/18 09:40





 02/05/18 09:40





<Morena Elias - Last Filed: 02/05/18 11:27>





Medical Decision Making





- Medical Decision Making








02/05/18 10:52


Case d/w Dr. Day. I have added on anemia labs. Will transfuse pRBCs. As per 

Dr. Day, will not give platelets at present.








<Yvonne Bess - Last Filed: 02/05/18 11:19>





- Medical Decision Making





02/05/18 10:45


Dr. Day paged via office number.


Awaiting call back. 





02/05/18 10:48


Dr. Day returned the page and the patients case was discussed. 





02/05/18 10:54


Paged Dr. Saha via office number.


Awaiting call back. 





02/05/18 11:16


Dr. Saha returned the page and the patients case was discussed.





<Morena Elias - Last Filed: 02/05/18 11:27>





*DC/Admit/Observation/Transfer





- Discharge Dispostion


Admit: Yes





<Yvonne Bess - Last Filed: 02/05/18 11:19>





- Attestations


Scribe Attestion: 





02/05/18 10:16





Documentation prepared by Morena Elias, acting as medical scribe for Yvonne Bess MD








<Morena Elias - Last Filed: 02/05/18 11:27>


Diagnosis at time of Disposition: 


Anemia


Qualifiers:


 Anemia type: unspecified type Qualified Code(s): D64.9 - Anemia, unspecified








- Discharge Dispostion


Condition at time of disposition: Stable





- Referrals


Referrals: 


Sara Saha MD [Primary Care Provider] -

## 2018-02-05 NOTE — EKG
Test Reason : 

Blood Pressure : ***/*** mmHG

Vent. Rate : 072 BPM     Atrial Rate : 051 BPM

   P-R Int : 000 ms          QRS Dur : 142 ms

    QT Int : 390 ms       P-R-T Axes : 000 102 004 degrees

   QTc Int : 427 ms

 

ATRIAL FIBRILLATION WITH A COMPETING JUNCTIONAL PACEMAKER

RIGHT BUNDLE BRANCH BLOCK

VOLTAGE CRITERIA FOR LEFT VENTRICULAR HYPERTROPHY

ABNORMAL ECG

WHEN COMPARED WITH ECG OF 11-JUN-2017 18:07,

NO SIGNIFICANT CHANGE WAS FOUND

Confirmed by CYNTHIA MCRAE, SHAYE ([id number]) on 2/5/2018 12:34:53 PM

 

Referred By:             Confirmed By:SHAYE MARIE MD

## 2018-02-06 LAB
ALBUMIN SERPL-MCNC: 2.8 G/DL (ref 3.4–5)
ALP SERPL-CCNC: 53 U/L (ref 45–117)
ALT SERPL-CCNC: 11 U/L (ref 12–78)
ANION GAP SERPL CALC-SCNC: 7 MMOL/L (ref 8–16)
AST SERPL-CCNC: 10 U/L (ref 15–37)
BASOPHILS # BLD: 0.4 % (ref 0–2)
BILIRUB SERPL-MCNC: 0.9 MG/DL (ref 0.2–1)
BUN SERPL-MCNC: 17 MG/DL (ref 7–18)
CALCIUM SERPL-MCNC: 8 MG/DL (ref 8.5–10.1)
CHLORIDE SERPL-SCNC: 101 MMOL/L (ref 98–107)
CO2 SERPL-SCNC: 27 MMOL/L (ref 21–32)
CREAT SERPL-MCNC: 0.9 MG/DL (ref 0.7–1.3)
DEPRECATED RDW RBC AUTO: 20.6 % (ref 11.9–15.9)
EOSINOPHIL # BLD: 0.5 % (ref 0–4.5)
GLUCOSE SERPL-MCNC: 75 MG/DL (ref 74–106)
HCT VFR BLD CALC: 31.5 % (ref 35.4–49)
HGB BLD-MCNC: 9.9 GM/DL (ref 11.7–16.9)
LYMPHOCYTES # BLD: 25.6 % (ref 8–40)
MCH RBC QN AUTO: 25.7 PG (ref 25.7–33.7)
MCHC RBC AUTO-ENTMCNC: 31.3 G/DL (ref 32–35.9)
MCV RBC: 81.9 FL (ref 80–96)
MONOCYTES # BLD AUTO: 7 % (ref 3.8–10.2)
NEUTROPHILS # BLD: 66.5 % (ref 42.8–82.8)
PLATELET # BLD AUTO: 64 K/MM3 (ref 134–434)
PMV BLD: 11.1 FL (ref 7.5–11.1)
POTASSIUM SERPLBLD-SCNC: 4 MMOL/L (ref 3.5–5.1)
PROT SERPL-MCNC: 8.7 G/DL (ref 6.4–8.2)
RBC # BLD AUTO: 3.84 M/MM3 (ref 4–5.6)
SERUM IRON SATURATION: 11 % (ref 15–55)
SODIUM SERPL-SCNC: 135 MMOL/L (ref 136–145)
TIBC SERPL-MCNC: 291 UG/DL (ref 250–450)
TRANSFERRIN SERPL-MCNC: 228 MG/DL (ref 200–370)
UIBC SERPL-MCNC: 258 UG/DL (ref 111–343)
WBC # BLD AUTO: 4.9 K/MM3 (ref 4–10)

## 2018-02-06 RX ADMIN — TAMSULOSIN HYDROCHLORIDE SCH MG: 0.4 CAPSULE ORAL at 09:00

## 2018-02-06 RX ADMIN — PANTOPRAZOLE SODIUM SCH MG: 40 TABLET, DELAYED RELEASE ORAL at 09:45

## 2018-02-06 RX ADMIN — AMLODIPINE BESYLATE SCH MG: 5 TABLET ORAL at 09:45

## 2018-02-06 RX ADMIN — GUAIFENESIN AND DEXTROMETHORPHAN HYDROBROMIDE PRN ML: 100; 10 SOLUTION ORAL at 17:19

## 2018-02-06 RX ADMIN — HYDROCHLOROTHIAZIDE SCH MG: 12.5 CAPSULE ORAL at 09:45

## 2018-02-06 RX ADMIN — CYANOCOBALAMIN SCH MCG: 1000 INJECTION, SOLUTION INTRAMUSCULAR at 12:26

## 2018-02-06 NOTE — HP
Admitting History and Physical





- Primary Care Physician


PCP: Sara Saha





- Admission


Chief Complaint: Weakness, Anemia


History of Present Illness: 





The patient is a 86 year old male, with a significant past medical history of 

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

, CAD,  Afib ( no AC), Thrombocytopenia, OA, BPH, Gastric Ulcers who presents 

to the emergency department sent in by PCP for low hemoglobin level. Patient 

has been feeling increasingly tired and generally weak for the past 2-3 days. 

Patient additionally endorses dry cough however denies any fever,chills. 

Routine lab work at PCP office revealed low blood counts and was sent to the ED 

for blood transfusion. 





Note: Prior to arrival, patient had head CT at [facility] for routine head CT. 





Patient denies any previous history of anemia or blood transfusion.  


Patient denies chest pain, headache or dizziness. 


Patient denies abdominal pain, nausea, vomit, diarrhea or constipation. 


Patient denies dysuria, frequency, urgency or hematuria. 


Patient denies sick contacts or recent travel. 





History Source: Patient, Medical Record


Limitations to Obtaining History: No Limitations





- Past Medical History


Cardiovascular: Yes: AFIB (chronic), HTN


Pulmonary: Yes: Other (?describes history of asbestosis. )


Gastrointestinal: Yes: Other


Renal/: Yes: BPH


Heme/Onc: Yes: Other (CLL, Lymphoma)





- Smoking History


Smoking history: Former smoker


Have you smoked in the past 12 months: No


Aproximately how many cigarettes per day: 10


If you are a former smoker, when did you quit?: 50 YRS AGO





- Alcohol/Substance Use


Hx Alcohol Use: No


History of Substance Use: reports: None





- Social History


ADL: Independent


History of Recent Travel: No





Home Medications





- Allergies


Allergies/Adverse Reactions: 


 Allergies











Allergy/AdvReac Type Severity Reaction Status Date / Time


 


cefepime Allergy   Verified 02/05/18 09:56














- Home Medications


Home Medications: 


Ambulatory Orders





Amlodipine Besylate [Norvasc -] 5 mg PO DAILY 02/05/18 


Hydrochlorothiazide 12.5 mg PO DAILY 02/05/18 


Pantoprazole Sodium 40 mg PO DAILY 02/05/18 


Tamsulosin HCl 0.4 mg PO DAILY 02/05/18 











Review of Systems





- Review of Systems


Constitutional: reports: Weakness


Eyes: reports: No Symptoms


HENT: reports: No Symptoms


Neck: reports: No Symptoms


Cardiovascular: reports: No Symptoms


Respiratory: reports: No Symptoms


Gastrointestinal: reports: No Symptoms


Genitourinary: reports: No Symptoms


Breasts: reports: No Symptoms Reported


Musculoskeletal: reports: No Symptoms


Integumentary: reports: No Symptoms


Neurological: reports: Weakness


Endocrine: reports: No Symptoms


Hematology/Lymphatic: reports: No Symptoms


Psychiatric: reports: No Symptoms





Physical Examination


Vital Signs: 


 Vital Signs











Temperature  98.2 F   02/06/18 09:51


 


Pulse Rate  72   02/06/18 09:51


 


Respiratory Rate  18   02/06/18 09:51


 


Blood Pressure  175/90   02/06/18 09:51


 


O2 Sat by Pulse Oximetry (%)  96   02/05/18 21:00











Constitutional: Yes: Well Nourished, No Distress, Calm


Cardiovascular: Yes: Regular Rate and Rhythm


Respiratory: Yes: Regular


Gastrointestinal: Yes: Normal Bowel Sounds


Musculoskeletal: Yes: WNL


Extremities: Yes: WNL


Edema: No


Peripheral Pulses WNL: Yes


Neurological: Yes: Alert, Oriented


Psychiatric: Yes: Alert, Oriented


Labs: 


 CBC, BMP





 02/05/18 09:40 











Problem List





- Problems


(1) Anemia


Code(s): D64.9 - ANEMIA, UNSPECIFIED   


Qualifiers: 


   Anemia type: unspecified type   Qualified Code(s): D64.9 - Anemia, 

unspecified   





(2) Atrial fibrillation, chronic


Code(s): I48.2 - CHRONIC ATRIAL FIBRILLATION   





(3) CLL (chronic lymphocytic leukemia)


Code(s): C91.10 - CHRONIC LYMPHOCYTIC LEUK OF B-CELL TYPE NOT ACHIEVE REMIS   





(4) Thrombocytopenia


Code(s): D69.6 - THROMBOCYTOPENIA, UNSPECIFIED   





Assessment/Plan





see problem list

## 2018-02-06 NOTE — PN
Progress Note (short form)





- Note


Progress Note: 





 Last Vital Signs











Temp Pulse Resp BP Pulse Ox


 


 98.2 F   72   18   175/90   96 


 


 02/06/18 09:51  02/06/18 09:51  02/06/18 09:51  02/06/18 09:51  02/05/18 21:00








 CBC, BMP





 02/06/18 06:45 





 02/06/18 06:45 





 Current Medications











Generic Name Dose Route Start Last Admin





  Trade Name Arjun  PRN Reason Stop Dose Admin


 


Amlodipine Besylate  5 mg  02/06/18 10:00  02/06/18 09:45





  Norvasc -  PO   5 mg





  DAILY DORA   Administration


 


Cyanocobalamin  1,000 mcg  02/06/18 11:00  02/06/18 12:26





  Vitamin B12 Injection -  IM   1,000 mcg





  DAILY DORA   Administration


 


Hydrochlorothiazide  12.5 mg  02/06/18 10:00  02/06/18 09:45





  Hctz -  PO   12.5 mg





  DAILY DORA   Administration


 


Prednisolone Acetate  1 each  02/06/18 00:00  02/06/18 12:27





1% Eye Drops Non-  OD   1 each





Formulary Med  Q6H DORA   Administration


 


Systane Eye Drops  1 each  02/05/18 22:24  02/06/18 06:44





  Non-Formulary Med  OD   1 each





  TID DORA   Administration


 


Alphagan P 1%  1 each  02/05/18 22:24  02/06/18 09:47





Eyedrops Non-  OD   1 each





Formulary Med  BID [facility]   Administration


 


Interferon Alpha 2b  1 each  02/06/18 06:00  02/06/18 06:08





Eye Drops Non-  OD   1 each





Formulary Med  TID [facility]   Administration


 


Non-Formulary Med:  2 each  02/06/18 22:00  





Imbruvica 140 Mg  PO   





Caps  HS [facility]   


 


Pantoprazole Sodium  40 mg  02/06/18 10:00  02/06/18 09:45





  Protonix -  PO   40 mg





  DAILY [facility]   Administration


 


Tamsulosin HCl  0.4 mg  02/06/18 08:30  02/06/18 09:00





  Flomax -  PO   0.4 mg





  DAILY@0830 [facility]   Administration

## 2018-02-06 NOTE — PN
Progress Note (short form)





- Note


Progress Note: 





Patient seen and examined


Complains of tiredness and weakness


 Complains of eye pains post surgery


 Last Vital Signs











Temp Pulse Resp BP Pulse Ox


 


 97.9 F   77   20   184/83   96 


 


 02/06/18 17:43  02/06/18 17:43  02/06/18 17:43  02/06/18 17:43  02/06/18 09:00











HEENT: s/p right eye surgery 


Oropharynx: No thrush, No mucositis


Nodes: Without adenopathy


Cor:atrila fib


Lungs: Clear to P&A


Abd: Soft, Normal bowel sounds, No organomegaly


Ext:No significant edema


Skin: No rashes, Integument intact


 CBC, BMP





 02/06/18 06:45 





 02/06/18 06:45 





 Current Medications











Generic Name Dose Route Start Last Admin





  Trade Name Freq  PRN Reason Stop Dose Admin


 


Amlodipine Besylate  5 mg  02/06/18 10:00  02/06/18 09:45





  Norvasc -  PO   5 mg





  DAILY DORA   Administration


 


Cyanocobalamin  1,000 mcg  02/06/18 11:00  02/06/18 12:26





  Vitamin B12 Injection -  IM   1,000 mcg





  DAILY DORA   Administration


 


Guaifenesin  10 ml  02/06/18 14:39  02/06/18 17:19





  Diabetic Tussin Dm -  PO   10 ml





  Q6H PRN   Administration





  COUGH   


 


Hydrochlorothiazide  12.5 mg  02/06/18 10:00  02/06/18 09:45





  Hctz -  PO   12.5 mg





  DAILY DORA   Administration


 


Prednisolone Acetate  1 each  02/06/18 00:00  02/06/18 17:52





1% Eye Drops Non-  OD   1 each





Formulary Med  Q6H DORA   Administration


 


Systane Eye Drops  1 each  02/05/18 22:24  02/06/18 14:14





  Non-Formulary Med  OD   1 each





  TID DORA   Administration


 


Alphagan P 1%  1 each  02/05/18 22:24  02/06/18 09:47





Eyedrops Non-  OD   1 each





Formulary Med  BID DORA   Administration


 


Interferon Alpha 2b  1 each  02/06/18 06:00  02/06/18 14:13





Eye Drops Non-  OD   1 each





Formulary Med  TID DORA   Administration


 


Non-Formulary Med:  2 each  02/06/18 22:00  





Imbruvica 140 Mg  PO   





Caps  HS DORA   


 


Pantoprazole Sodium  40 mg  02/06/18 10:00  02/06/18 09:45





  Protonix -  PO   40 mg





  DAILY DORA   Administration


 


Tamsulosin HCl  0.4 mg  02/06/18 08:30  02/06/18 09:00





  Flomax -  PO   0.4 mg





  DAILY@0830 DORA   Administration








Impression:





CLL/Lymphplasmacytic lymphoma


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 


Chronic thrombocytopenia ( typically in the 50-70K rang) -No change fromcurrent


Atrial fib- no a/c in view of Ibrutinib and thrombocytopenia. 


The AF preceded Ibrutinib therapy


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 





Plan:





Current counts are similar to usual office counts. 


Needs outpatient optho follow up


Continue Ibrutinib at current dose.

## 2018-02-07 LAB
IGA SER-ACNC: 64 MG/DL (ref 61–437)
IGM SERPL-MCNC: 3131 MG/DL (ref 15–143)

## 2018-02-07 RX ADMIN — GUAIFENESIN AND DEXTROMETHORPHAN HYDROBROMIDE PRN ML: 100; 10 SOLUTION ORAL at 12:01

## 2018-02-07 RX ADMIN — TAMSULOSIN HYDROCHLORIDE SCH MG: 0.4 CAPSULE ORAL at 09:19

## 2018-02-07 RX ADMIN — PANTOPRAZOLE SODIUM SCH MG: 40 TABLET, DELAYED RELEASE ORAL at 09:19

## 2018-02-07 RX ADMIN — GUAIFENESIN AND DEXTROMETHORPHAN HYDROBROMIDE PRN ML: 100; 10 SOLUTION ORAL at 01:06

## 2018-02-07 RX ADMIN — HYDROCHLOROTHIAZIDE SCH MG: 12.5 CAPSULE ORAL at 09:38

## 2018-02-07 RX ADMIN — CYANOCOBALAMIN SCH MCG: 1000 INJECTION, SOLUTION INTRAMUSCULAR at 09:19

## 2018-02-07 RX ADMIN — AMLODIPINE BESYLATE SCH MG: 5 TABLET ORAL at 09:20

## 2018-02-07 RX ADMIN — GUAIFENESIN AND DEXTROMETHORPHAN HYDROBROMIDE PRN ML: 100; 10 SOLUTION ORAL at 18:45

## 2018-02-07 NOTE — PN
Progress Note (short form)





- Note


Progress Note: 





PULMONARY








CONSULTATION DICTATED 2/71/8





IMP SYMPTOMATIC ANEMIA


      COUGH


      CLL/LYMPHOMA


      THROMBOCYTOPOENIA


      AFIB NOT ON AC


      ASHD


      SUBDURAL HEMATOMA


      H/O PROSTATE CA





PLAN TRANSFUSE


         O2


         MONITOR H+H,PLT CT


         CHEST X-RAY





DR ARAGON


       








Problem List





- Problems


(1) Cough


Code(s): R05 - COUGH   





(2) Anemia


Code(s): D64.9 - ANEMIA, UNSPECIFIED   


Qualifiers: 


   Anemia type: unspecified type   Qualified Code(s): D64.9 - Anemia, 

unspecified   





(3) A-fib


Code(s): I48.91 - UNSPECIFIED ATRIAL FIBRILLATION   





(4) CLL (chronic lymphocytic leukemia)


Code(s): C91.10 - CHRONIC LYMPHOCYTIC LEUK OF B-CELL TYPE NOT ACHIEVE REMIS   





(5) Thrombocytopenia


Code(s): D69.6 - THROMBOCYTOPENIA, UNSPECIFIED

## 2018-02-07 NOTE — PN
Progress Note, Physician


Chief Complaint: 





Anemia


History of Present Illness: 





NAD, in bed, 


coughing





- Current Medication List


Current Medications: 


Active Medications





Amlodipine Besylate (Norvasc -)  5 mg PO DAILY [facility]


   Last Admin: 02/07/18 09:20 Dose:  5 mg


Cyanocobalamin (Vitamin B12 Injection -)  1,000 mcg IM DAILY [facility]


   Last Admin: 02/07/18 09:19 Dose:  1,000 mcg


Guaifenesin (Diabetic Tussin Dm -)  10 ml PO Q6H PRN


   PRN Reason: COUGH


   Last Admin: 02/07/18 01:06 Dose:  10 ml


Hydrochlorothiazide (Hctz -)  12.5 mg PO DAILY [facility]


   Last Admin: 02/07/18 09:38 Dose:  12.5 mg


Prednisolone Acetate 1% Eye Drops Non- Formulary Med  1 each OD Q6H [facility]


   Last Admin: 02/07/18 05:40 Dose:  Not Given


Systane Eye Drops (Non-Formulary Med)  1 each OD TID [facility]


   Last Admin: 02/07/18 07:02 Dose:  1 each


Alphagan P 1% Eyedrops Non- Formulary Med  1 each OD BID [facility]


   Last Admin: 02/07/18 09:29 Dose:  1 each


Interferon Alpha 2b Eye Drops Non- Formulary Med  1 each OD TID [facility]


   Last Admin: 02/07/18 06:04 Dose:  1 each


Non-Formulary Med: Imbruvica 140 Mg Caps  2 each PO HS [facility]


   Last Admin: 02/06/18 21:07 Dose:  2 each


Pantoprazole Sodium (Protonix -)  40 mg PO DAILY [facility]


   Last Admin: 02/07/18 09:19 Dose:  40 mg


Tamsulosin HCl (Flomax -)  0.4 mg PO DAILY@0830 [facility]


   Last Admin: 02/07/18 09:19 Dose:  0.4 mg











- Objective


Vital Signs: 


 Vital Signs











Temperature  97.8 F   02/07/18 09:33


 


Pulse Rate  73   02/07/18 09:33


 


Respiratory Rate  18   02/07/18 09:33


 


Blood Pressure  146/74   02/07/18 09:33


 


O2 Sat by Pulse Oximetry (%)  95   02/06/18 21:00











Constitutional: Yes: Well Nourished, No Distress, Calm


Cardiovascular: Yes: Regular Rate and Rhythm


Respiratory: Yes: Regular


Gastrointestinal: Yes: WNL, Normal Bowel Sounds, Soft


Musculoskeletal: Yes: WNL


Extremities: Yes: WNL


Edema: No


Peripheral Pulses WNL: Yes


Neurological: Yes: Alert, Oriented


Psychiatric: Yes: Alert, Oriented


Labs: 


 CBC, BMP





 02/06/18 06:45 





 02/06/18 06:45 





 INR, PTT











INR  1.17  (0.82-1.09)  H  02/05/18  09:40    














Problem List





- Problems


(1) Anemia


Assessment/Plan: 


stable


-f/u outpatient with hematology


Code(s): D64.9 - ANEMIA, UNSPECIFIED   


Qualifiers: 


   Anemia type: unspecified type   Qualified Code(s): D64.9 - Anemia, 

unspecified   





(2) Atrial fibrillation, chronic


Assessment/Plan: 


chronic


Code(s): I48.2 - CHRONIC ATRIAL FIBRILLATION   





(3) CLL (chronic lymphocytic leukemia)


Code(s): C91.10 - CHRONIC LYMPHOCYTIC LEUK OF B-CELL TYPE NOT ACHIEVE REMIS   





(4) Thrombocytopenia


Code(s): D69.6 - THROMBOCYTOPENIA, UNSPECIFIED

## 2018-02-07 NOTE — CONSULT
Consult - text type





- Consultation


Consultation Note: 





 Neurology


Chief Complaint: Weakness, Anemia


History of Present Illness: 





The patient is a 86 year old male, with a significant past medical history of 

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

, CAD,  Afib ( no AC), Thrombocytopenia, OA, BPH, Gastric Ulcers who presented 

to the emergency department sent in by PCP for low hemoglobin level. Patient 

has been feeling increasingly tired and generally weak for 2-3 days. He was 

admitted for transfusions. CT head completed and showed Right sided fronto-

parietal SDH, chronic appearing. Ct did not detect acute blood products. Also 

with paroxysmal Afib. 





- Past Medical History


Cardiovascular: Yes: AFIB (chronic), HTN


Pulmonary: Yes: Other (?describes history of asbestosis. )


Gastrointestinal: Yes: Other


Renal/: Yes: BPH


Heme/Onc: Yes: Other (CLL, Lymphoma)





- Smoking History


Smoking history: Former smoker


Have you smoked in the past 12 months: No


Aproximately how many cigarettes per day: 10


If you are a former smoker, when did you quit?: 50 YRS AGO





- Alcohol/Substance Use


Hx Alcohol Use: No


History of Substance Use: reports: None





- Social History


ADL: Independent


History of Recent Travel: No





 Home Medications 





- Allergies


Allergies/Adverse Reactions: 


 Allergies











Allergy/AdvReac Type Severity Reaction Status Date / Time


 


cefepime Allergy   Verified 02/05/18 09:56














- Home Medications


Home Medications: 


Ambulatory Orders





Amlodipine Besylate [Norvasc -] 5 mg PO DAILY 02/05/18 


Hydrochlorothiazide 12.5 mg PO DAILY 02/05/18 


Pantoprazole Sodium 40 mg PO DAILY 02/05/18 


Tamsulosin HCl 0.4 mg PO DAILY 02/05/18 











 Review of Systems 





- Review of Systems


Constitutional: reports: Weakness


Eyes: reports: No Symptoms


HENT: reports: No Symptoms


Neck: reports: No Symptoms


Cardiovascular: reports: No Symptoms


Respiratory: reports: No Symptoms


Gastrointestinal: reports: No Symptoms


Genitourinary: reports: No Symptoms


Breasts: reports: No Symptoms Reported


Musculoskeletal: reports: No Symptoms


Integumentary: reports: No Symptoms


Neurological: reports: Weakness


Endocrine: reports: No Symptoms


Hematology/Lymphatic: reports: No Symptoms


Psychiatric: reports: No Symptoms





 Physical Examination 


 Vital Signs











Temperature  97.8 F   02/07/18 09:33


 


Pulse Rate  73   02/07/18 09:33


 


Respiratory Rate  18   02/07/18 09:33


 


Blood Pressure  146/74   02/07/18 09:33


 


O2 Sat by Pulse Oximetry (%)  95   02/06/18 21:00














Constitutional: Yes: Well Nourished, No Distress, Calm


Cardiovascular: Yes: Regular Rate and Rhythm


Respiratory: Yes: Clear to ascultation


Gastrointestinal: Yes: Normal Bowel Sounds


Musculoskeletal: Yes: WNL


Extremities: Yes: WNL


Edema: No


Peripheral Pulses WNL: Yes


Neurological: Yes: Alert, Oriented, conversive, CN intact, sensory intact, 

strength grossly symmetric, gait deferred








 


 CBCD











WBC  4.9 K/mm3 (4.0-10.0)   02/06/18  06:45    


 


RBC  3.84 M/mm3 (4.00-5.60)  L  02/06/18  06:45    


 


Hgb  9.9 GM/dL (11.7-16.9)  L D 02/06/18  06:45    


 


Hct  31.5 % (35.4-49)  L D 02/06/18  06:45    


 


MCV  81.9 fl (80-96)   02/06/18  06:45    


 


MCHC  31.3 g/dl (32.0-35.9)  L  02/06/18  06:45    


 


RDW  20.6 % (11.9-15.9)  H  02/06/18  06:45    


 


Plt Count  64 K/MM3 (134-434)  L  02/06/18  06:45    


 


MPV  11.1 fl (7.5-11.1)  D 02/06/18  06:45    








 CMP











Sodium  135 mmol/L (136-145)  L  02/06/18  06:45    


 


Potassium  4.0 mmol/L (3.5-5.1)   02/06/18  06:45    


 


Chloride  101 mmol/L ()   02/06/18  06:45    


 


Carbon Dioxide  27 mmol/L (21-32)   02/06/18  06:45    


 


Anion Gap  7  (8-16)  L  02/06/18  06:45    


 


BUN  17 mg/dL (7-18)   02/06/18  06:45    


 


Creatinine  0.9 mg/dL (0.7-1.3)   02/06/18  06:45    


 


Creat Clearance w eGFR  > 60  (>60)   02/06/18  06:45    


 


Calcium  8.0 mg/dL (8.5-10.1)  L  02/06/18  06:45    


 


Total Bilirubin  0.9 mg/dL (0.2-1.0)  D 02/06/18  06:45    


 


AST  10 U/L (15-37)  L  02/06/18  06:45    


 


ALT  11 U/L (12-78)  L  02/06/18  06:45    


 


Alkaline Phosphatase  53 U/L ()   02/06/18  06:45    


 


Total Protein  8.7 g/dl (6.4-8.2)  H  02/06/18  06:45    


 


Albumin  2.8 g/dl (3.4-5.0)  L  02/06/18  06:45    














 Plan


86 year old male, with a significant past medical history of CLL/

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

,  Afib ( no AC), Thrombocytopenia, OA, BPH, Gastric Ulcers who presented to 

the emergency department sent in by PCP for low hemoglobin level. Patient has 

been feeling increasingly tired and generally weak for 2-3 days. He was 

admitted for transfusions. CT head completed and showed Right sided fronto-

parietal SDH, chronic appearing. Ct did not detect acute blood products. Also 

with paroxysmal Afib. Would monitor gait, fall precautions. Continue hydration 

and transfusion as needed. SDH would not require further intervention, chronic, 

stable.

## 2018-02-07 NOTE — CONSULT
Consult - text type





- Consultation


Consultation Note: 





NEUROSURGERY CONSULTATION





Patient is a pleasant 86 year old male who presented to the [facility] ER with 

Anemia and has multiple active medical problems. He has atrial fibrillation, 

however is not maintained on anticoagulation due to Thrombocytopenia. CT of the 

Head reveals a thin Right Frontal/Parietal Subdural hematoma which is subacute 

to chronic appearing based upon density and there is no layering of blood 

products. There appears to be an arachnoid plane with a layer of CSF between 

this collection and the pial surfaces as well as no blood products in the sulci 

which mauy explain why he has no edema/irritation of the cortical surface from 

the blood products. 





On exam he is awake and alert and is eating without difficulty. He speaks and 

gestures freely and there is no suggestion of psychomotor slowing. There is no 

pronator drift and no extinction to double simultaneous stimulation.





Given his medical problems and lack of symptoms directly from the collection, I 

feel that conservative/observant management would be appropriate. I would not 

follow him with routine imaging, however, progression of symptoms may be 

evaluated with a repeat non-contrast Head CT. Should the collection grow or the 

patient to develop symptoms, a minimally invasive twist drill craniostomy could 

be performed under local anesthetic to decompress the collection.

## 2018-02-08 VITALS — TEMPERATURE: 97.7 F | DIASTOLIC BLOOD PRESSURE: 80 MMHG | SYSTOLIC BLOOD PRESSURE: 150 MMHG | HEART RATE: 84 BPM

## 2018-02-08 LAB — KAPPA LC FREE SER-MCNC: 17.9 MG/L (ref 3.3–19.4)

## 2018-02-08 PROCEDURE — 30233N1 TRANSFUSION OF NONAUTOLOGOUS RED BLOOD CELLS INTO PERIPHERAL VEIN, PERCUTANEOUS APPROACH: ICD-10-PCS | Performed by: FAMILY MEDICINE

## 2018-02-08 PROCEDURE — 3E033GC INTRODUCTION OF OTHER THERAPEUTIC SUBSTANCE INTO PERIPHERAL VEIN, PERCUTANEOUS APPROACH: ICD-10-PCS | Performed by: FAMILY MEDICINE

## 2018-02-08 RX ADMIN — AMLODIPINE BESYLATE SCH MG: 5 TABLET ORAL at 09:58

## 2018-02-08 RX ADMIN — GUAIFENESIN AND DEXTROMETHORPHAN HYDROBROMIDE PRN ML: 100; 10 SOLUTION ORAL at 06:45

## 2018-02-08 RX ADMIN — CYANOCOBALAMIN SCH MCG: 1000 INJECTION, SOLUTION INTRAMUSCULAR at 09:58

## 2018-02-08 RX ADMIN — GUAIFENESIN AND DEXTROMETHORPHAN HYDROBROMIDE PRN ML: 100; 10 SOLUTION ORAL at 00:18

## 2018-02-08 RX ADMIN — HYDROCHLOROTHIAZIDE SCH MG: 12.5 CAPSULE ORAL at 09:58

## 2018-02-08 RX ADMIN — TAMSULOSIN HYDROCHLORIDE SCH MG: 0.4 CAPSULE ORAL at 08:45

## 2018-02-08 RX ADMIN — PANTOPRAZOLE SODIUM SCH MG: 40 TABLET, DELAYED RELEASE ORAL at 09:58

## 2018-02-08 NOTE — PN
Progress Note (short form)





- Note


Progress Note: 





PULMONARY





Feels about the same. +cough with dark sputum. No fevers or chills.





 Last Vital Signs











Temp Pulse Resp BP Pulse Ox


 


 97.7 F   84   20   150/80   96 


 


 02/08/18 09:45  02/08/18 09:45  02/08/18 09:45  02/08/18 09:45  02/08/18 10:13








Gen:  NAD at rest


Heart: RRR


Lung: decreased breath sounds at the bases


Abd: soft, nontender


Ext: no edema





 CBC, BMP





 02/06/18 06:45 





 02/06/18 06:45 





Active Medications





Amlodipine Besylate (Norvasc -)  5 mg PO DAILY [facility]


   Last Admin: 02/08/18 09:58 Dose:  5 mg


Cyanocobalamin (Vitamin B12 Injection -)  1,000 mcg IM DAILY [facility]


   Last Admin: 02/08/18 09:58 Dose:  1,000 mcg


Guaifenesin (Diabetic Tussin Dm -)  10 ml PO Q6H PRN


   PRN Reason: COUGH


   Last Admin: 02/08/18 06:45 Dose:  10 ml


Hydrochlorothiazide (Hctz -)  12.5 mg PO DAILY [facility]


   Last Admin: 02/08/18 09:58 Dose:  12.5 mg


Latanoprost (Xalatan 0.005% Eye Drops -)  1 drop OD HS [facility]


   Last Admin: 02/07/18 22:03 Dose:  1 drop


Prednisolone Acetate 1% Eye Drops Non- Formulary Med  1 each OD Q6H [facility]


   Last Admin: 02/08/18 06:05 Dose:  1 each


Systane Eye Drops (Non-Formulary Med)  1 each OD TID [facility]


   Last Admin: 02/08/18 06:06 Dose:  1 each


Alphagan P 1% Eyedrops Non- Formulary Med  1 each OD BID [facility]


   Last Admin: 02/08/18 10:04 Dose:  1 each


Non-Formulary Med: Imbruvica 140 Mg Caps  2 each PO HS [facility]


   Last Admin: 02/07/18 22:50 Dose:  Not Given


Interferon Alpha 2b Eye Drops Non- Formulary Med  1 each OD 0900,1300,1700,2100 

[facility]


   Last Admin: 02/08/18 10:04 Dose:  1 each


Pantoprazole Sodium (Protonix -)  40 mg PO DAILY [facility]


   Last Admin: 02/08/18 09:58 Dose:  40 mg


Tamsulosin HCl (Flomax -)  0.4 mg PO DAILY@0830 [facility]


   Last Admin: 02/08/18 08:45 Dose:  0.4 mg





A/P


Anemia


Pneumonia


CLL


Thrombocytopenia


Atrial Fibrillation


Subdural Hematoma


CAD





-  continue antibiotics


-  monitor H/H


-  rate control


-  O2 as needed


-  DVT prophylaxis


-  outpt f/u of CXR

## 2018-02-08 NOTE — CONS
DATE OF CONSULTATION:  02/07/2018

 

REFERRING PHYSICIAN:  Sara Saha MD 

 

The patient is an 86-year-old white male with past medical history of _____ lymphoma,

on Imbruvica, prostate CA status post seed implants, hypertension, atrial

fibrillation, ASHD (of note is patient is not on anticoagulation), thrombocytopenia,

osteoarthritis, BPH, gastric ulcers.  Admitted to [facility] with

complaint of increasing shortness of breath.  Patient recently noted the past couple

of weeks having increased shortness of breath, getting worse over the past 2-3 days. 

He apparently had a blood test performed by the PMD which revealed anemia.  He was

admitted for the above.  

 

Patient was admitted to the floor.  He received transfusion.  Of note is the patient

also complains of a cough which is productive of occasional brown sputum for the past

couple of weeks, for which he was given antibiotics with some improvement.  Denies

any history of COPD or asthma in the past.  He denies any fevers, hemoptysis.  Denies

any chest pains or palpitations.  Denies any recent travel.  There is no history of

DVT or PE in the past.  

 

Of note is also he underwent a CAT scan of the head which revealed evidence of

subdural collection of 8 mm, most likely representing chronic subdural hemorrhage

without gross mass effect.  He was evaluated by Neurosurgery and Neurology and

recommended conservative measures.  

 

PAST MEDICAL HISTORY:  Again includes chronic CLL, lymphoma, prostate CA,

hypertension, ASHD, atrial fibrillation not on AC, thrombocytopenia, osteoarthritis,

BPH, gastric ulcers.  

 

SOCIAL HISTORY:  Born in [place].  Moved to the [place] years ago.  History of

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

 History of smoking many years ago.

 

CURRENT MEDICATIONS:  Include prednisone, Systane eye drops, _____ eye drops,

interferon, Flomax, _____, amlodipine, Protonix, hydrochlorothiazide, vitamin B12.

 

REVIEW OF SYSTEMS:  Positive cough.  Positive sputum.  No chest pain.  No

palpitations.  Positive shortness of breath.  Positive weakness.  No hemoptysis.  No

abdominal pain.  No lower extremity edema.

 

PHYSICAL EXAMINATION: 

General:  The patient is a well-developed, well-nourished male, awake, alert, in no

acute distress.  

Vital signs:  He is currently afebrile.  Blood pressure is 149/70, respiratory rate

is 20, O2 saturation is 95% on room air.  

HEENT:  His head is normocephalic, atraumatic.  

Neck:  Supple.  

Heart:  Irregularly irregular.  Normal S1, S2.  

Chest:  Bilateral crackles one-third up.  

Abdomen:  Soft.  Bowel sounds positive.  

Extremities:  No cyanosis, edema. 

 

LABORATORIES:  WBC is 4.9, hemoglobin 9.9, hematocrit 31.5, platelet count of 64,000.

 INR is 1.17.  BUN 17, creatinine 0.9.  Chest x-ray is pending.  

 

IMPRESSION:

1.  Symptomatic anemia.

2.  Cough, possibly secondary to recent bronchitis.

3.  Atherosclerotic heart disease .

4.  Atrial fibrillation not on anticoagulation.

5.  History of chronic lymphocytic leukemia/lymphoplasmacytic lymphoma, on Imbruvica.

6.  Prostate carcinoma.

7.  Subdural hematoma, age indeterminate.

8.  Thrombocytopenia.  

 

PLAN:  Continue to monitor hemoglobin and hematocrit.  Supplemental O2 p.r.n.  Obtain

chest x-ray.  Monitor electrolytes.  Monitor CBC.  Platelet count.  

 

 

MELODY ARAGON M.D.

 

LUIS [id number]

DD: 02/07/2018 16:35

DT: 02/07/2018 18:39

Job #:  [id number]

## 2018-02-08 NOTE — PN
Progress Note (short form)





- Note


Progress Note: 





 Neurology








The patient is a 86 year old male, with a significant past medical history of 

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

, CAD,  Afib ( no AC), Thrombocytopenia, OA, BPH, Gastric Ulcers who presented 

to the emergency department sent in by PCP for low hemoglobin level. Patient 

has been feeling increasingly tired and generally weak for 2-3 days. He was 

admitted for transfusions. CT head completed and showed Right sided fronto-

parietal SDH, chronic appearing. Ct did not detect acute blood products. Also 

with paroxysmal Afib. Patient doing well otherwise. Complaints of R eye 

discomfort from recent biopsy.  





Active Medications





Amlodipine Besylate (Norvasc -)  5 mg PO DAILY [facility]


   Last Admin: 02/08/18 09:58 Dose:  5 mg


Cyanocobalamin (Vitamin B12 Injection -)  1,000 mcg IM DAILY [facility]


   Last Admin: 02/08/18 09:58 Dose:  1,000 mcg


Guaifenesin (Diabetic Tussin Dm -)  10 ml PO Q6H PRN


   PRN Reason: COUGH


   Last Admin: 02/08/18 06:45 Dose:  10 ml


Hydrochlorothiazide (Hctz -)  12.5 mg PO DAILY [facility]


   Last Admin: 02/08/18 09:58 Dose:  12.5 mg


Latanoprost (Xalatan 0.005% Eye Drops -)  1 drop OD HS [facility]


   Last Admin: 02/07/18 22:03 Dose:  1 drop


Prednisolone Acetate 1% Eye Drops Non- Formulary Med  1 each OD Q6H [facility]


   Last Admin: 02/08/18 06:05 Dose:  1 each


Systane Eye Drops (Non-Formulary Med)  1 each OD TID [facility]


   Last Admin: 02/08/18 06:06 Dose:  1 each


Alphagan P 1% Eyedrops Non- Formulary Med  1 each OD BID [facility]


   Last Admin: 02/08/18 10:04 Dose:  1 each


Non-Formulary Med: Imbruvica 140 Mg Caps  2 each PO HS [facility]


   Last Admin: 02/07/18 22:50 Dose:  Not Given


Interferon Alpha 2b Eye Drops Non- Formulary Med  1 each OD 0900,1300,1700,2100 

[facility]


   Last Admin: 02/08/18 10:04 Dose:  1 each


Pantoprazole Sodium (Protonix -)  40 mg PO DAILY [facility]


   Last Admin: 02/08/18 09:58 Dose:  40 mg


Tamsulosin HCl (Flomax -)  0.4 mg PO DAILY@0830 [facility]


   Last Admin: 02/08/18 08:45 Dose:  0.4 mg











 Physical Examination 


 


 Vital Signs











Temperature  97.7 F   02/08/18 09:45


 


Pulse Rate  84   02/08/18 09:45


 


Respiratory Rate  20   02/08/18 09:45


 


Blood Pressure  150/80   02/08/18 09:45


 


O2 Sat by Pulse Oximetry (%)  96   02/07/18 21:00

















Constitutional: Yes: Well Nourished, No Distress, Calm


Cardiovascular: Yes: Regular Rate and Rhythm


Respiratory: Yes: Clear to ascultation


Gastrointestinal: Yes: Normal Bowel Sounds


Musculoskeletal: Yes: WNL


Extremities: Yes: WNL


Edema: No


Peripheral Pulses WNL: Yes


Neurological: Yes: Alert, Oriented, conversive, CN intact, sensory intact, 

strength grossly symmetric, gait deferred








 


 


 CBCD











WBC  4.9 K/mm3 (4.0-10.0)   02/06/18  06:45    


 


RBC  3.84 M/mm3 (4.00-5.60)  L  02/06/18  06:45    


 


Hgb  9.9 GM/dL (11.7-16.9)  L D 02/06/18  06:45    


 


Hct  31.6 % (37.5-51.0)  L  02/06/18  06:45    


 


MCV  81.9 fl (80-96)   02/06/18  06:45    


 


MCHC  31.3 g/dl (32.0-35.9)  L  02/06/18  06:45    


 


RDW  20.6 % (11.9-15.9)  H  02/06/18  06:45    


 


Plt Count  64 K/MM3 (134-434)  L  02/06/18  06:45    


 


MPV  11.1 fl (7.5-11.1)  D 02/06/18  06:45    








 CMP











Sodium  135 mmol/L (136-145)  L  02/06/18  06:45    


 


Potassium  4.0 mmol/L (3.5-5.1)   02/06/18  06:45    


 


Chloride  101 mmol/L ()   02/06/18  06:45    


 


Carbon Dioxide  27 mmol/L (21-32)   02/06/18  06:45    


 


Anion Gap  7  (8-16)  L  02/06/18  06:45    


 


BUN  17 mg/dL (7-18)   02/06/18  06:45    


 


Creatinine  0.9 mg/dL (0.7-1.3)   02/06/18  06:45    


 


Creat Clearance w eGFR  > 60  (>60)   02/06/18  06:45    


 


Calcium  8.0 mg/dL (8.5-10.1)  L  02/06/18  06:45    


 


Total Bilirubin  0.9 mg/dL (0.2-1.0)  D 02/06/18  06:45    


 


AST  10 U/L (15-37)  L  02/06/18  06:45    


 


ALT  11 U/L (12-78)  L  02/06/18  06:45    


 


Alkaline Phosphatase  53 U/L ()   02/06/18  06:45    


 


Total Protein  8.7 g/dl (6.4-8.2)  H  02/06/18  06:45    


 


Albumin  2.8 g/dl (3.4-5.0)  L  02/06/18  06:45    

















 Plan


86 year old male, with a significant past medical history of CLL/

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

,  Afib ( no AC), Thrombocytopenia, OA, BPH, Gastric Ulcers who presented to 

the emergency department sent in by PCP for low hemoglobin level. Patient has 

been feeling increasingly tired and generally weak for 2-3 days. He was 

admitted for transfusions. CT head completed and showed Right sided fronto-

parietal SDH, chronic appearing. Ct did not detect acute blood products. Also 

with paroxysmal Afib. Would monitor gait, fall precautions. Continue hydration 

and transfusion as needed. SDH would not require further intervention, chronic, 

stable. Complains about R eye discomfort from biopsy but otherwise stable.

## 2018-02-08 NOTE — PN
Progress Note, Physician


Chief Complaint: 





Anemia


History of Present Illness: 





NAD, in bed, 


coughing


CXR showed possible PNE


will start on PO abx and discharge





- Current Medication List


Current Medications: 


Active Medications





Amlodipine Besylate (Norvasc -)  5 mg PO DAILY [facility]


   Last Admin: 02/08/18 09:58 Dose:  5 mg


Cyanocobalamin (Vitamin B12 Injection -)  1,000 mcg IM DAILY [facility]


   Last Admin: 02/08/18 09:58 Dose:  1,000 mcg


Guaifenesin (Diabetic Tussin Dm -)  10 ml PO Q6H PRN


   PRN Reason: COUGH


   Last Admin: 02/08/18 06:45 Dose:  10 ml


Hydrochlorothiazide (Hctz -)  12.5 mg PO DAILY [facility]


   Last Admin: 02/08/18 09:58 Dose:  12.5 mg


Latanoprost (Xalatan 0.005% Eye Drops -)  1 drop OD HS [facility]


   Last Admin: 02/07/18 22:03 Dose:  1 drop


Prednisolone Acetate 1% Eye Drops Non- Formulary Med  1 each OD Q6H [facility]


   Last Admin: 02/08/18 06:05 Dose:  1 each


Systane Eye Drops (Non-Formulary Med)  1 each OD TID [facility]


   Last Admin: 02/08/18 06:06 Dose:  1 each


Alphagan P 1% Eyedrops Non- Formulary Med  1 each OD BID [facility]


   Last Admin: 02/08/18 10:04 Dose:  1 each


Non-Formulary Med: Imbruvica 140 Mg Caps  2 each PO HS [facility]


   Last Admin: 02/07/18 22:50 Dose:  Not Given


Interferon Alpha 2b Eye Drops Non- Formulary Med  1 each OD 0900,1300,1700,2100 

[facility]


   Last Admin: 02/08/18 10:04 Dose:  1 each


Pantoprazole Sodium (Protonix -)  40 mg PO DAILY [facility]


   Last Admin: 02/08/18 09:58 Dose:  40 mg


Tamsulosin HCl (Flomax -)  0.4 mg PO DAILY@0830 [facility]


   Last Admin: 02/08/18 08:45 Dose:  0.4 mg











- Objective


Vital Signs: 


 Vital Signs











Temperature  97.7 F   02/08/18 09:45


 


Pulse Rate  84   02/08/18 09:45


 


Respiratory Rate  20   02/08/18 09:45


 


Blood Pressure  150/80   02/08/18 09:45


 


O2 Sat by Pulse Oximetry (%)  96   02/07/18 21:00











Constitutional: Yes: Well Nourished, No Distress, Calm


Cardiovascular: Yes: Regular Rate and Rhythm


Respiratory: Yes: Regular, Cough


Gastrointestinal: Yes: WNL


Musculoskeletal: Yes: WNL


Extremities: Yes: WNL


Edema: No


Peripheral Pulses WNL: Yes


Neurological: Yes: Alert, Oriented


Psychiatric: Yes: Alert, Oriented


Labs: 


 CBC, BMP





 02/06/18 06:45 





 02/06/18 06:45 





 INR, PTT











INR  1.17  (0.82-1.09)  H  02/05/18  09:40    














Problem List





- Problems


(1) Anemia


Assessment/Plan: 


stable


-f/u outpatient with hematology


Code(s): D64.9 - ANEMIA, UNSPECIFIED   


Qualifiers: 


   Anemia type: unspecified type   Qualified Code(s): D64.9 - Anemia, 

unspecified   





(2) Atrial fibrillation, chronic


Assessment/Plan: 


chronic


No AC due to thrombocytopenia


Code(s): I48.2 - CHRONIC ATRIAL FIBRILLATION   





(3) CLL (chronic lymphocytic leukemia)


Code(s): C91.10 - CHRONIC LYMPHOCYTIC LEUK OF B-CELL TYPE NOT ACHIEVE REMIS   





(4) Thrombocytopenia


Code(s): D69.6 - THROMBOCYTOPENIA, UNSPECIFIED   





(5) Pneumonia


Assessment/Plan: 


-on CXR


-Pulmonary notified


-mild cough


-d/c on Moxifloxacin


Code(s): J18.9 - PNEUMONIA, UNSPECIFIED ORGANISM   





Assessment/Plan





see problem list


Spoke to Son about discharge and f/u outpatient

## 2018-02-09 NOTE — PATH
Surgical Pathology Report



Patient Name:  ILIANA KNIGHT

Accession #:  

Med. Rec. #:  [id number]                                                        

   /Age/Gender:  1931 (Age: 86) / M

Account:  [id number]                                                          

             Location: [facility] MED/SURG

Taken:  2018

Received:  2018

Reported:  2018

Physicians:  Sara Saha M.D.

  



Specimen(s) Received

 PERIPHERAL BLOOD 2 GREEN TOPS 





Clinical History

History of CLL, now with new onset cytopenia







Final Diagnosis

COMPREHENSIVE FLOW CYTOMETRY performed and interpreted at [facility],

[place], [place] ([id number])



INTERPRETATION: 

Small (0.6% of total events) clonal B-cell population with non-specific

immunophenotype is detected, see comment. There is no evidence of T-cell

proliferative disorders or increased blasts. 



Comment: Prior bone marrow evaluation on record ([id number], 10/2015)

demonstrated extensive (80-90%) bone marrow involvement by two low-grade B-cell

lymphomas.



See [facility] report for details ([id number]).





***Electronically Signed***

Maty Whitaker M.D.





Gross Description

Received are 2 green top tubes of peripheral blood which are sent to [facility].

/2018

## 2019-11-26 ENCOUNTER — HOSPITAL ENCOUNTER (INPATIENT)
Dept: HOSPITAL 74 - JER | Age: 84
LOS: 4 days | Discharge: HOME HEALTH | DRG: 312 | End: 2019-11-30
Attending: FAMILY MEDICINE | Admitting: INTERNAL MEDICINE
Payer: COMMERCIAL

## 2019-11-26 VITALS — BODY MASS INDEX: 25 KG/M2

## 2019-11-26 DIAGNOSIS — F02.80: ICD-10-CM

## 2019-11-26 DIAGNOSIS — N40.0: ICD-10-CM

## 2019-11-26 DIAGNOSIS — G30.9: ICD-10-CM

## 2019-11-26 DIAGNOSIS — C91.10: ICD-10-CM

## 2019-11-26 DIAGNOSIS — K21.9: ICD-10-CM

## 2019-11-26 DIAGNOSIS — D64.9: ICD-10-CM

## 2019-11-26 DIAGNOSIS — I45.10: ICD-10-CM

## 2019-11-26 DIAGNOSIS — D69.6: ICD-10-CM

## 2019-11-26 DIAGNOSIS — I48.20: ICD-10-CM

## 2019-11-26 DIAGNOSIS — I25.10: ICD-10-CM

## 2019-11-26 DIAGNOSIS — R55: Primary | ICD-10-CM

## 2019-11-26 LAB
ALBUMIN SERPL-MCNC: 3.2 G/DL (ref 3.4–5)
ALP SERPL-CCNC: 52 U/L (ref 45–117)
ALT SERPL-CCNC: 16 U/L (ref 13–61)
ANION GAP SERPL CALC-SCNC: 2 MMOL/L (ref 8–16)
APPEARANCE UR: CLEAR
APTT BLD: 33.2 SECONDS (ref 25.2–36.5)
AST SERPL-CCNC: 20 U/L (ref 15–37)
BACTERIA # UR AUTO: 2.4 /HPF
BASOPHILS # BLD: 0.2 % (ref 0–2)
BILIRUB SERPL-MCNC: 0.7 MG/DL (ref 0.2–1)
BILIRUB UR STRIP.AUTO-MCNC: NEGATIVE MG/DL
BUN SERPL-MCNC: 24.8 MG/DL (ref 7–18)
CALCIUM SERPL-MCNC: 9 MG/DL (ref 8.5–10.1)
CASTS URNS QL MICRO: 10 /LPF (ref 0–8)
CHLORIDE SERPL-SCNC: 104 MMOL/L (ref 98–107)
CO2 SERPL-SCNC: 30 MMOL/L (ref 21–32)
COLOR UR: YELLOW
CREAT SERPL-MCNC: 1 MG/DL (ref 0.55–1.3)
DEPRECATED RDW RBC AUTO: 14.7 % (ref 11.9–15.9)
EOSINOPHIL # BLD: 0.1 % (ref 0–4.5)
EPITH CASTS URNS QL MICRO: 2.2 /HPF
GLUCOSE SERPL-MCNC: 124 MG/DL (ref 74–106)
HCT VFR BLD CALC: 40.3 % (ref 35.4–49)
HGB BLD-MCNC: 13.4 GM/DL (ref 11.7–16.9)
INR BLD: 1.12 (ref 0.83–1.09)
KETONES UR QL STRIP: NEGATIVE
LEUKOCYTE ESTERASE UR QL STRIP.AUTO: (no result)
LYMPHOCYTES # BLD: 13.9 % (ref 8–40)
MAGNESIUM SERPL-MCNC: 2.3 MG/DL (ref 1.8–2.4)
MCH RBC QN AUTO: 33.7 PG (ref 25.7–33.7)
MCHC RBC AUTO-ENTMCNC: 33.3 G/DL (ref 32–35.9)
MCV RBC: 101.4 FL (ref 80–96)
MONOCYTES # BLD AUTO: 6.2 % (ref 3.8–10.2)
NEUTROPHILS # BLD: 79.6 % (ref 42.8–82.8)
NITRITE UR QL STRIP: NEGATIVE
PH UR: 7 [PH] (ref 5–8)
PHOSPHATE SERPL-MCNC: 3.2 MG/DL (ref 2.5–4.9)
PLATELET # BLD AUTO: 51 K/MM3 (ref 134–434)
PMV BLD: 12.4 FL (ref 7.5–11.1)
POTASSIUM SERPLBLD-SCNC: 4.7 MMOL/L (ref 3.5–5.1)
PROT SERPL-MCNC: 9.3 G/DL (ref 6.4–8.2)
PROT UR QL STRIP: (no result)
PROT UR QL STRIP: NEGATIVE
PT PNL PPP: 13.2 SEC (ref 9.7–13)
RBC # BLD AUTO: 3.98 M/MM3 (ref 4–5.6)
SODIUM SERPL-SCNC: 137 MMOL/L (ref 136–145)
SP GR UR: 1.02 (ref 1.01–1.03)
UROBILINOGEN UR STRIP-MCNC: 1 MG/DL (ref 0.2–1)
WBC # BLD AUTO: 5.6 K/MM3 (ref 4–10)
WBC # UR AUTO: 7 /HPF (ref 0–5)

## 2019-11-26 NOTE — PDOC
History of Present Illness





- General


Stated Complaint: VOMITNG,NAUSEA


Time Seen by Provider: 11/26/19 14:28





tPA Exclusion checklist 3-4.5h





- Time Elapsed


Date last known well: 11/26/19


Time last known well: 09:30


Elaspsed time:  Day(s) and 9 Hour(s) and 59 Minutes 





- Thrombolytic Therapy Candidate


Is patient eligible for thrombolytic therapy: No





- Ineligibility reason(s)


Reasons No tPA given: Outside of window - delayed arrival, See reason(s) noted 

above (NIHSS 1)





NIH Stroke Scale





- Last Known Well Date/Time & Onset


Date Last Known Well: 11/26/19


Time Last Known Well: 09:30





- Initial Evaluation


Level of consciousness: Alert


Ask patient the month and their age: Answers one correctly


Ask patient to open & close eyes; make fist and let go: Obeys both correctly


Best gaze (horizontal eye movement): Normal


Visual field testing: No visual field loss


Facial paresis (Show teeth/raise eyebrows/close eyes tight): Normal symmetrical 

movement


Motor Function: Left Arm: Normal


Motor Function:  Right Arm: Normal (extends arm 90 (or 45) degrees for 10 

seconds without drift


Motor Function:  Left Leg: Normal (extends leg 30 degrees for 5 seconds without 

drift)


Motor Function:  Right Leg: Normal (extends leg 30 degrees for 5 seconds 

without drift)


Limb Ataxia: No ataxia


Sensory(Use pinprick test arms,legs,trunk,face/side to side): Normal


Best language (Describe picture, name items, read sentences): No Aphasia


Dysarthria (read several words): Normal articulation


Extinction and Inattention: No abnormality





- Total Score


NIH Stroke Scale Score: 1





Past History





- Past Medical History


Allergies/Adverse Reactions: 


 Allergies











Allergy/AdvReac Type Severity Reaction Status Date / Time


 


cefepime Allergy   Verified 02/05/18 09:56











Home Medications: 


Ambulatory Orders





Ibrutinib [Imbruvica] 140 mg PO DAILY 11/26/19 


Losartan Potassium 50 mg PO DAILY 11/26/19 


Pantoprazole Sodium 40 mg PO DAILY 11/26/19 


Tamsulosin HCl [Flomax] 0.4 mg PO DAILY 11/26/19 








Anemia: No


Asthma: No


Cancer: Yes (LYMPHOMA)


Cardiac Disorders: Yes (A-fib)


CVA: No


COPD: No


CHF: No


Dementia: No


Diabetes: No


GI Disorders: No


 Disorders: Yes (Enlarged Prostate)


HTN: Yes


Hypercholesterolemia: No


Liver Disease: No


Seizures: No


Thyroid Disease: No





- Surgical History


Abdominal Surgery: Yes (HERNIA REPAIR)


Appendectomy: No


Cardiac Surgery: No


Cholecystectomy: No


Lung Surgery: No


Neurologic Surgery: No


Orthopedic Surgery: No





- Psycho Social/Smoking Cessation Hx


Smoking History: Former smoker


Have you smoked in the past 12 months: No


Number of Cigarettes Smoked Daily: 10


If you are a former smoker, when did you quit?: 50 YRS AGO


Hx Alcohol Use: No


Drug/Substance Use Hx: No


Substance Use Type: None


Hx Substance Use Treatment: No





ED Treatment Course





- LABORATORY


CBC & Chemistry Diagram: 


 11/26/19 15:33





 11/26/19 15:33





Medical Decision Making





- Medical Decision Making





11/26/19 15:27


HPI:


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

seed implants), HTN, CAD, Afib (not on AC), thrombocytopenia, OA, BPH, and 

gastric ulcers BIBA from home with family for syncopal episode this AM. Pt 

states he feels fine right now with no complaints, pt does not remember what 

happened this AM. Pt denies full ROS. Hx obtained from family. Pt has been 

generally weaker x1mo, but still able to walk with cane. Pt has also had 

worsening memory issues and confusion for the past 3-4mo, denies dementia dx. 

Pt lives at home with family. States pt at a little less yesterday and did not 

eat this AM. Pt was sitting at the dining table for breakfast this AM and 

suddenly at 0930 he was witnessed to straighten up and become rigid for a few 

seconds before passing out and slumping over and his whole body shaking for 3-5 

minutes. Family caught him as he slumped over so he remained in the chair, no 

head injury or other injuries. When the episode ended, pt was weak, lightheaded

, nauseated, had 2 episodes of NBNB emesis, and complained of a major headache (

pt denies now). Denies urinary or stool incontinence or tongue biting. Pt was 

able to stand with assistance and walk 4 steps to stretcher. States pt is at 

baseline mental status and behaviour. Family states this has happened 3-4x in 

the past year, last time this summer, but it has normally happened when he is 

outside and the weather is hot. Pt has not gone to a doctor or ED after one of 

these episodes before. Denies recent travel, recent illness, sick contacts, 

head injury, trauma, falls, numbness/tingling, focal weakness, vision changes, 

hx seizures. Pt's R eye is partially sewn shot from a procedure a long time ago 

and pt has baseline decreased vision in R eye and tends to just keep that 

eyelid shut.





PCP -Dr Saha





ROS: obtained from family


Constitutional: Positive for generalized weakness. Negative for chills, fever, 

fatigue, diaphoresis.


HENT: Negative for sore throat, rhinorrhea, congestion.


Eyes: Negative for visual disturbance.


Respiratory: Negative for shortness of breath, cough, and wheezing.


Cardiovascular: Negative for chest pain, palpitations, and leg swelling.


Gastrointestinal: Positive for nausea and vomiting. Negative for abdominal pain

, blood in stool, constipation, diarrhea.


Genitourinary: Negative for dysuria, flank pain, and hematuria.


Musculoskeletal: Negative for myalgias, back pain, and neck pain.


Skin: Negative for rash.


Neurological: Positive for syncope, light-headedness, and headache. Negative 

for vertigo, weakness, numbness.


Psychiatric/Behavioral: Positive for some confusion/memory issues. Negative for 

behavioral problems.





PE:


Gen: Alert, NAD, comfortable-appearing, appears to have vomit stained shirt


HEENT: R eyelid partially sewn shut, but able to open. PERRL, EOMI, MMM, NCAT. 

No conjunctival pallor. Sclera are non-icteric. Oropharynx is clear.


CV: Regular rate and rhythm. No murmurs, rubs, or gallops.


PULM: No resp distress. CTAB, no wheezes, rales, or rhonchi.


ABD: soft, NT/ND, no rebound tenderness or guarding, no CVA tenderness.


BACK: No TTP of c/t/l-spine. No step-offs or deformities.


MSK: No bony deformities. 2+ pulses in all extremities.


NEURO: AAOx3. PERRL. CN 2-12 intact. 5/5 strength in all extremities. Sensation 

to light touch intact in all extremities. No pronator drift. No dysmetria. No 

dysdiadochokinesia. No abnormal nystagmus.


EXTREMITIES: No cyanosis. No clubbing. No edema. No calf tenderness.


PSYCH: Normal mood and thought pattern.


SKIN: Warm and dry. Normal capillary refill. No rashes. No jaundice.








MDM:


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

seed implants), HTN, CAD, Afib (not on AC), thrombocytopenia, OA, BPH, and 

gastric ulcers BIBA from home with family for seizure vs syncopal episode at 

0930 today, 3-4 similar episodes in past year. Hemodynamically stable, afebrile

, baseline confused but otherwise neurologically intact, no e/o trauma on exam.





Presentation most consistent with syncope, but also consider seizures due to 

reported rigidity followed by tremors. Low concern for cardiac etiology due to 

lack of CP or palpitations, but assess and r/o ACS/MI or arrythmias with EKG 

and cardiac profile. Also consider SAH, dehydration, infectious etiologies, 

anemia, or metabolic derangements. Vasovagal or orthostatic syncope unlikely 

due to presentation/lack of inciting events. Lack of fever, photophobia, or 

neck pain/stiffness not concerning for meningitis.





-CBC,CMP,Coags,Cardiac profile,Mg,Phos,T&S,UA/UC


-EKG


-CXR


-CTH


-IVF


-Dispo: likely admit tele/obs syncope eval





11/26/19 17:01


NBNB emesis x4, complaining of vertigo, nausea, and diaphoresis of rapid onset. 

Most likely peripheral vertiginous sx but also consider posterior stroke.


Will give Zofran and additional IVF.


EKG reviewed: Afib, RBBB, vent rate 66bpm, QTc 469ms, normal axis, no e/o acute 

ischemia, no significant changes from prior


Labs reviewed. No concerning findings.


Pending urine and CTH read.





11/26/19 17:26


CTH reviewed: no e/o acute intracranial pathology. Mild periventricular chronic 

microvascular changes noted. In comparison to prior CT exam 2/5/18, interval 

resolution of small late subacute to chronic subdural hematoma along R cerebral 

convexity. Note again made of small amount of fluid within R mastoid air cells 

as well as mild bilateral maxillary sinus mucosal thickening.


Microblog for admission to stroke unit, recommend syncope vs seizure eval and 

MRI for posterior stroke r/o.





11/26/19 19:29


Pt signed out and admitted to hospitalist.


2nd trop neg.





Discharge





- Discharge Information


Problems reviewed: Yes


Clinical Impression/Diagnosis: 


 A-fib, Syncope, Emesis, Vertigo





Condition: Fair





- Admission


Yes





- Follow up/Referral





- Patient Discharge Instructions





- Post Discharge Activity

## 2019-11-26 NOTE — PDOC
Attending Attestation





- Resident


Resident Name: Yvonne Ragsdale





- ED Attending Attestation


I have performed the following: I have examined & evaluated the patient, The 

case was reviewed & discussed with the resident, I agree w/resident's findings 

& plan





- HPI


HPI: 





11/26/19 15:56


88-year-old male with history of atrial fibrillation, lymphoma presents brought 

in by family with syncopal episode this morning.  Patient was in his usual 

state of health which includes being active and verbal with some baseline 

dementia, while seated at the table this morning for breakfast had increased 

tonicity followed by loss of consciousness for about 2 minutes, no fall or 

injury, returned to alertness with some complaint of headache followed by 

vomiting, no complaints of chest pain or palpitations.  Patient returned to 

baseline mental status but with notably increased fatigue throughout the day, 

brought in by family for evaluation.





Normal diet recently, no recent fevers or chills, no recent falls or 

cardiopulmonary complaints.





- Physicial Exam


PE: 





11/26/19 15:58


Blood pressure slightly elevated, otherwise afebrile with normal O2 sat


Exam is atraumatic, right lid ptosis/closed chronically


No C-spine tenderness, full range of motion


Heart is irregular with 2 out of 6 systolic ejection murmur, lungs with coarse 

bibasilar crackles, otherwise clear without wheezing


Abdomen benign


Neurological exam is nonfocal





- Medical Decision Making





11/26/19 15:59


88-year-old male with history of atrial fibrillation presents with sudden loss 

of consciousness this morning without injury, concerning for arrhythmia versus 

seizure.  Had headache with vomiting after episode, now neurologically intact 

with slightly elevated blood pressure but otherwise relatively well-appearing 

without acute complaints.


Check labs, EKG


Chest x-ray, CT head


Admission for telemetry monitoring





**Heart Score/ECG Review


  ** #1


ECG reviewed & interpreted by me at: 15:50


General ECG Interpretation: Normal Rate (afib at 66), Normal Intervals (qtc 469

, rbbb), No acute ischemic changes

## 2019-11-26 NOTE — HP
Admitting History and Physical





- Primary Care Physician


PCP: Sara Saha





- Admission


History of Present Illness: 





This is a 88-year-old man with a past medical history of Atrial Fibrillation, 

CAD, Lymphoma,Dementia. Who presents to the ED with family for syncopal episode 

this morning. Patient has Dementia unable to provide HPI. Per Ed record: 

Patient was in his usual state of health which includes being active and verbal 

with some baseline dementia, while seated at the table this morning for 

breakfast had increased tonicity followed by loss of consciousness for about 2 

minutes, no fall or injury, returned to alertness with some complaint of 

headache followed by vomiting, no complaints of chest pain or palpitations.  

Patient returned to baseline mental status but with notably increased fatigue 

throughout the day, brought in by family for evaluation.





Normal diet recently, no recent fevers or chills, no recent falls or 

cardiopulmonary complaints.





ED course was noted for:





(1) Head CT- no acute intracranial pathology


(2) EKG- Afib 69BPM, RBBB


(3)





History Source: Family Member


Limitations to Obtaining History: Dementia





- Past Medical History


CNS: Yes: Dementia


Cardiovascular: Yes: AFIB (chronic), CAD, HTN


Pulmonary: Yes: Other (?describes history of asbestosis. )


Gastrointestinal: Yes: Other


Renal/: Yes: BPH


Heme/Onc: Yes: Other (CLL, Lymphoma)





- Smoking History


Smoking history: Former smoker


Have you smoked in the past 12 months: No


Aproximately how many cigarettes per day: 10


If you are a former smoker, when did you quit?: 50 YRS AGO





- Alcohol/Substance Use


Hx Alcohol Use: No


History of Substance Use: reports: None





- Social History


Usual Living Arrangement: Yes: With Child


ADL: Family Assistance


History of Recent Travel: No





Home Medications





- Allergies


Allergies/Adverse Reactions: 


 Allergies











Allergy/AdvReac Type Severity Reaction Status Date / Time


 


cefepime Allergy   Verified 02/05/18 09:56














- Home Medications


Home Medications: 


Ambulatory Orders





Ibrutinib [Imbruvica] 140 mg PO DAILY 11/26/19 


Losartan Potassium 50 mg PO DAILY 11/26/19 


Pantoprazole Sodium 40 mg PO DAILY 11/26/19 


Tamsulosin HCl [Flomax] 0.4 mg PO DAILY 11/26/19 











Family Medical History


Family History: Unable to Obtain





Review of Systems


Unable to obtain ROS, reason: Dementia





Physical Examination


Vital Signs: 


 Vital Signs











Temperature  98.2 F   11/26/19 14:00


 


Pulse Rate  65   11/26/19 14:00


 


Respiratory Rate  16   11/26/19 14:00


 


Blood Pressure  176/79 H  11/26/19 14:00


 


O2 Sat by Pulse Oximetry (%)  96   11/26/19 14:00











Constitutional: Yes: No Distress, Calm


Eyes: Yes: Conjunctiva Clear (left eye), EOM Intact (left eye), PERRL (left eye)

, Other (right eye partially closed from prior procedure)


HENT: Yes: WNL, Atraumatic, Normocephalic


Neck: Yes: WNL, Supple, Trachea Midline


Cardiovascular: Yes: Pulse Irregular, Murmur (grade 2/6), S1, S2


Respiratory: Yes: WNL, Regular, CTA Bilaterally


Gastrointestinal: Yes: WNL, Normal Bowel Sounds, Soft


Renal/: Yes: Incontinence


Breast(s): Yes: WNL


Musculoskeletal: Yes: WNL


Extremities: Yes: WNL


Edema: No


Peripheral Pulses WNL: Yes


Neurological: Yes: Alert, Confusion, Cran Nerves II-XII Intact


...Motor Strength: WNL


Psychiatric: Yes: Alert


Labs: 


 CBC, BMP





 11/26/19 15:33 





 11/26/19 15:33 





 Laboratory Results - last 24 hr











  11/26/19 11/26/19 11/26/19





  15:33 15:33 15:33


 


WBC   


 


RBC   


 


Hgb   


 


Hct   


 


MCV   


 


MCH   


 


MCHC   


 


RDW   


 


Plt Count   


 


MPV   


 


Absolute Neuts (auto)   


 


Neutrophils %   


 


Lymphocytes %   


 


Monocytes %   


 


Eosinophils %   


 


Basophils %   


 


Nucleated RBC %   


 


PT with INR   13.20 H 


 


INR   1.12 H 


 


PTT (Actin FS)   33.2 


 


Sodium   


 


Potassium   


 


Chloride   


 


Carbon Dioxide   


 


Anion Gap   


 


BUN   


 


Creatinine   


 


Est GFR (CKD-EPI)AfAm   


 


Est GFR (CKD-EPI)NonAf   


 


Random Glucose   


 


Calcium   


 


Phosphorus   


 


Magnesium   


 


Total Bilirubin   


 


AST   


 


ALT   


 


Alkaline Phosphatase   


 


Creatine Kinase    26


 


Troponin I    < 0.02


 


Total Protein   


 


Albumin   


 


Urine Color   


 


Urine Appearance   


 


Urine pH   


 


Ur Specific Gravity   


 


Urine Protein   


 


Urine Glucose (UA)   


 


Urine Ketones   


 


Urine Blood   


 


Urine Nitrite   


 


Urine Bilirubin   


 


Urine Urobilinogen   


 


Ur Leukocyte Esterase   


 


Urine WBC (Auto)   


 


Urine RBC (Auto)   


 


Urine Casts (Auto)   


 


U Epithel Cells (Auto)   


 


Urine Bacteria (Auto)   


 


Blood Type  A POSITIVE  


 


Antibody Screen  Negative  














  11/26/19 11/26/19 11/26/19





  15:33 15:33 18:35


 


WBC  5.6  


 


RBC  3.98 L  


 


Hgb  13.4  


 


Hct  40.3  D  


 


MCV  101.4 H  


 


MCH  33.7  


 


MCHC  33.3  


 


RDW  14.7  D  


 


Plt Count  51 L  


 


MPV  12.4 H  


 


Absolute Neuts (auto)  4.4  


 


Neutrophils %  79.6  D  


 


Lymphocytes %  13.9  D  


 


Monocytes %  6.2  


 


Eosinophils %  0.1  D  


 


Basophils %  0.2  


 


Nucleated RBC %  0  


 


PT with INR   


 


INR   


 


PTT (Actin FS)   


 


Sodium   137 


 


Potassium   4.7 


 


Chloride   104 


 


Carbon Dioxide   30 


 


Anion Gap   2 L 


 


BUN   24.8 H 


 


Creatinine   1.0 


 


Est GFR (CKD-EPI)AfAm   77.54 


 


Est GFR (CKD-EPI)NonAf   66.90 


 


Random Glucose   124 H 


 


Calcium   9.0 


 


Phosphorus   3.2 


 


Magnesium   2.3 


 


Total Bilirubin   0.7 


 


AST   20 


 


ALT   16 


 


Alkaline Phosphatase   52 


 


Creatine Kinase   


 


Troponin I    < 0.02


 


Total Protein   9.3 H 


 


Albumin   3.2 L 


 


Urine Color   


 


Urine Appearance   


 


Urine pH   


 


Ur Specific Gravity   


 


Urine Protein   


 


Urine Glucose (UA)   


 


Urine Ketones   


 


Urine Blood   


 


Urine Nitrite   


 


Urine Bilirubin   


 


Urine Urobilinogen   


 


Ur Leukocyte Esterase   


 


Urine WBC (Auto)   


 


Urine RBC (Auto)   


 


Urine Casts (Auto)   


 


U Epithel Cells (Auto)   


 


Urine Bacteria (Auto)   


 


Blood Type   


 


Antibody Screen   














  11/26/19





  19:40


 


WBC 


 


RBC 


 


Hgb 


 


Hct 


 


MCV 


 


MCH 


 


MCHC 


 


RDW 


 


Plt Count 


 


MPV 


 


Absolute Neuts (auto) 


 


Neutrophils % 


 


Lymphocytes % 


 


Monocytes % 


 


Eosinophils % 


 


Basophils % 


 


Nucleated RBC % 


 


PT with INR 


 


INR 


 


PTT (Actin FS) 


 


Sodium 


 


Potassium 


 


Chloride 


 


Carbon Dioxide 


 


Anion Gap 


 


BUN 


 


Creatinine 


 


Est GFR (CKD-EPI)AfAm 


 


Est GFR (CKD-EPI)NonAf 


 


Random Glucose 


 


Calcium 


 


Phosphorus 


 


Magnesium 


 


Total Bilirubin 


 


AST 


 


ALT 


 


Alkaline Phosphatase 


 


Creatine Kinase 


 


Troponin I 


 


Total Protein 


 


Albumin 


 


Urine Color  Yellow


 


Urine Appearance  Clear


 


Urine pH  7.0


 


Ur Specific Gravity  1.023


 


Urine Protein  1+ H


 


Urine Glucose (UA)  Negative


 


Urine Ketones  Negative


 


Urine Blood  Negative


 


Urine Nitrite  Negative


 


Urine Bilirubin  Negative


 


Urine Urobilinogen  1.0


 


Ur Leukocyte Esterase  1+ H


 


Urine WBC (Auto)  7


 


Urine RBC (Auto)  3


 


Urine Casts (Auto)  10


 


U Epithel Cells (Auto)  2.2


 


Urine Bacteria (Auto)  2.4


 


Blood Type 


 


Antibody Screen 








 Intake & Output











 11/24/19 11/25/19 11/26/19 11/27/19





 23:59 23:59 23:59 23:59


 


Weight   74.843 kg 








 Current Medications











Generic Name Dose Route Start Last Admin





  Trade Name Freq  PRN Reason Stop Dose Admin


 


Losartan Potassium  50 mg  11/27/19 10:00  





  Cozaar -  PO   





  DAILY DORA   





     





     





     





     


 


Non-Formulary Medication  140 mg  11/27/19 10:00  





  Ibrutinib [Imbruvica]  PO   





  DAILY DORA   





     





     





     





     


 


Pantoprazole Sodium  40 mg  11/27/19 10:00  





  Protonix -  PO   





  DAILY DORA   





     





     





     





     


 


Tamsulosin HCl  0.4 mg  11/27/19 08:30  





  Flomax -  PO   





  DAILY@0830 [facility]   





     





     





     





     














Imaging





- Results


Chest X-ray: Report Reviewed, Image Reviewed


Cat Scan: Report Reviewed, Image Reviewed


EKG: Image Reviewed





Problem List





- Problems


(1) Syncope


Assessment/Plan: 


Likely secondary to Arrhythmia vs Seizure vs Stroke


Continue Cardiac monitoring


Serial enzymes








Code(s): R55 - SYNCOPE AND COLLAPSE   





(2) CLL (chronic lymphocytic leukemia)


Assessment/Plan: 


Continue home med


f/u with Oncologist in outpatient


Code(s): C91.10 - CHRONIC LYMPHOCYTIC LEUK OF B-CELL TYPE NOT ACHIEVE REMIS   





(3) Atrial fibrillation, chronic


Assessment/Plan: 


Continue to monitor and treat with interventions accordingly


EKG- Afib with RBBB


[id number]


no current AC


Will defer to Cardiology


Continue cardiac monitoring





Code(s): I48.2 - CHRONIC ATRIAL FIBRILLATION * DO NOT USE *   





(4) Hypertension


Assessment/Plan: 


stable


Monitor BP


Continue home med


Monitor renal function





Code(s): I10 - ESSENTIAL (PRIMARY) HYPERTENSION   


Qualifiers: 


   Hypertension type: essential hypertension   Qualified Code(s): I10 - 

Essential (primary) hypertension   





(5) Dementia


Assessment/Plan: 


supportive care


Fall Precautions


no current home med


Code(s): F03.90 - UNSPECIFIED DEMENTIA WITHOUT BEHAVIORAL DISTURBANCE   





Assessment/Plan





This is a 87 y/o man with a PMHx of Afib, CAD, CLL, Dementia. Admitted to 

Telemetry for Syncope for further evaluation of their emergent condition





Plan:


See Problem List








FEN


PO fluids as tolerated


Replete lytes prn


Low Na Diet





DVT ppx


OOB


SCDs


Hold AC due to Thrombocytopenia





Dispo: Requires Inpatient Care











Visit type





- Emergency Visit


Emergency Visit: Yes


ED Registration Date: 11/26/19


Care time: The patient presented to the Emergency Department on the above date 

and was hospitalized for further evaluation of their emergent condition.





- New Patient


This patient is new to me today: Yes


Date on this admission: 11/26/19





- Critical Care


Critical Care patient: No

## 2019-11-27 LAB
ALBUMIN SERPL-MCNC: 3 G/DL (ref 3.4–5)
ALP SERPL-CCNC: 46 U/L (ref 45–117)
ALT SERPL-CCNC: 15 U/L (ref 13–61)
ANION GAP SERPL CALC-SCNC: 4 MMOL/L (ref 8–16)
AST SERPL-CCNC: 14 U/L (ref 15–37)
BASOPHILS # BLD: 0.3 % (ref 0–2)
BILIRUB SERPL-MCNC: 0.7 MG/DL (ref 0.2–1)
BUN SERPL-MCNC: 23.8 MG/DL (ref 7–18)
CALCIUM SERPL-MCNC: 8.7 MG/DL (ref 8.5–10.1)
CHLORIDE SERPL-SCNC: 106 MMOL/L (ref 98–107)
CHOLEST SERPL-MCNC: 84 MG/DL (ref 50–200)
CO2 SERPL-SCNC: 29 MMOL/L (ref 21–32)
CREAT SERPL-MCNC: 1 MG/DL (ref 0.55–1.3)
DEPRECATED RDW RBC AUTO: 14.7 % (ref 11.9–15.9)
EOSINOPHIL # BLD: 0.3 % (ref 0–4.5)
GLUCOSE SERPL-MCNC: 83 MG/DL (ref 74–106)
HCT VFR BLD CALC: 35.5 % (ref 35.4–49)
HDLC SERPL-MCNC: 36 MG/DL (ref 40–60)
HGB BLD-MCNC: 11.8 GM/DL (ref 11.7–16.9)
LDLC SERPL CALC-MCNC: 45 MG/DL (ref 5–100)
LYMPHOCYTES # BLD: 21.7 % (ref 8–40)
MCH RBC QN AUTO: 33.5 PG (ref 25.7–33.7)
MCHC RBC AUTO-ENTMCNC: 33.3 G/DL (ref 32–35.9)
MCV RBC: 100.7 FL (ref 80–96)
MONOCYTES # BLD AUTO: 8.9 % (ref 3.8–10.2)
NEUTROPHILS # BLD: 68.8 % (ref 42.8–82.8)
PHOSPHATE SERPL-MCNC: 2.9 MG/DL (ref 2.5–4.9)
PLATELET # BLD AUTO: 42 K/MM3 (ref 134–434)
PLATELET BLD QL SMEAR: (no result)
PMV BLD: 12.1 FL (ref 7.5–11.1)
POTASSIUM SERPLBLD-SCNC: 3.8 MMOL/L (ref 3.5–5.1)
PROT SERPL-MCNC: 8.4 G/DL (ref 6.4–8.2)
RBC # BLD AUTO: 3 /HPF (ref 0–4)
RBC # BLD AUTO: 3.53 M/MM3 (ref 4–5.6)
SODIUM SERPL-SCNC: 139 MMOL/L (ref 136–145)
TRIGL SERPL-MCNC: 32 MG/DL (ref 0–150)
WBC # BLD AUTO: 5.6 K/MM3 (ref 4–10)

## 2019-11-27 RX ADMIN — PANTOPRAZOLE SODIUM SCH MG: 40 TABLET, DELAYED RELEASE ORAL at 09:32

## 2019-11-27 RX ADMIN — LEVETIRACETAM SCH MG: 500 TABLET, FILM COATED ORAL at 22:03

## 2019-11-27 RX ADMIN — TAMSULOSIN HYDROCHLORIDE SCH MG: 0.4 CAPSULE ORAL at 09:32

## 2019-11-27 NOTE — CONSULT
Consult - text type





- Consultation


Consultation Note: 


NEUROLOGY CONSULT GREATLY APPRECIATED:





Events reviewed. Patient examined. Pt is poor historian- history from medical 

records.


This 89 yo RH  man lives with family. Recently has required of cane for 

balance.


PMHX: Leukemia, HTN, GERD. BPH, Prostate CA, Afib (not on AC), memory loss. 


On: Imbruvica, Losartan, Pantoprazole, tamsulosin. 


Records state pt seated for breakfast this AM and suddenly had "increased tone" 

with "whole body shaking" for few minutes with possible LOC. 


He slumped over, but then awoke and described headache and nausea/vomiting. 

Since dissipated.


Family describe 2-3 similar episodes over this past year. 


Pt unable to provide cogent history, ambulating around hospital, confused, 

looking for his wife. 


BP on admission 177/102-> 161/90





Head CT (reviewed): Moderate diffuse atrophy with ex vacuo ventricular 

dilation. Calcified intracranial arteries. No acute pathology.


Carotid duplex dopplers: No sig hemodynamic stenosis


EKG Afib with BBBB


WBC= 5.6K; platelets 51; CK= 26; TSH 0.72; UA WBC= 5





BRANDO: Afebrile. Cor irregular. No bruit. Neck supple. No evidence of head 

trauma. 


NEURO: Awake, alert, slight distracted. Follows 3 step commands. Hypophonic. Ox 

"[facility]" Nov, 1985. TRUMP. No reversals. +glabella, snout, suck


           CNII-CNXII: OD ptosis, but full EOM's. Full fields. No facial. Gag 

ok.


           Motor: No drift. + Fine Sustention tremor. + Cogwheeling R > L. 

Decreased CHERELLE's. Strength normal. Reflexes brisk with spread in arms, 


                      present KJ's, reduced AJs. Plantars silent. 


           Coordination: No FTN dystaxia.


           Sensation: Feels pinch in all fours.


           Gait: Flexed, shuffling. Retropulsive. 





Impression: Moderate B/L Cerebral Dysfunction (OMS, chronic) C/W Alzheimer's 

Disease 


                Post-prandial syncope vs. Seizure


                Extrapyramidal features c/w Parkinsonism


                Atrial Fibrillation 


                


Suggest: Load with leveteracitam 500 mg IVP x 1. Then continue leveteracitam 

500 mg BID.


             MRI of brain (C-) 


             Orthostatic BPs


             Reduce Systolic BP < 140s. Cardiology eval for Afib qne possible 

anticoagulation. 


             Check B12, RPR


             PT eval for gait safety with walker


              eval





Thank you very much, 


Shay Quan MD

## 2019-11-27 NOTE — ECHO
______________________________________________________________________________



Name: ILIANA KNIGHT                                 Exam:Adult Echocardiogram

MRN: [id number]         Study Date: 2019 02:01 PM

Age: 88 yrs

______________________________________________________________________________



Reason For Study: SYNCOPE

Height: 68 in        Weight: 165 lb        BSA: 1.9 m2



______________________________________________________________________________



MMode/2D Measurements & Calculations

IVSd: 1.4 cm                                            Ao root diam: 1.7 cm

LVIDd: 3.5 cm                                           LA dimension: 4.8 cm

LVIDs: 2.8 cm                                           ACS: 1.5 cm

LVPWd: 1.2 cm



_________________________________________________________

EDV(Teich): 50.3 ml                                     LVOT diam: 1.7 cm

ESV(Teich): 29.9 ml



_________________________________________________________

RV S Maicol: 12.7 cm/sec



Doppler Measurements & Calculations

Ao V2 max: 226.2 cm/sec                             LV V1 max PG: 3.4 mmHg

Ao max P.5 mmHg                                LV V1 mean P.8 mmHg

Ao V2 mean: 166.5 cm/sec                            LV V1 max: 91.9 cm/sec

Ao mean P.1 mmHg                               LV V1 mean: 62.2 cm/sec

Ao V2 VTI: 45.1 cm                                  LV V1 VTI: 18.4 cm



MIGUEL A(I,D): 0.95 cm2

MIGUEL A(V,D): 0.94 cm2

_____________________________________________________



MR max maicol: 551.4 cm/sec                            SV(LVOT): 42.7 ml

MR max P.2 mmHg

_____________________________________________________



TR max maicol: 250.6 cm/sec                            Med Peak E' Maicol: 6.1 cm/sec

TR max P.2 mmHg



______________________________________________________________________________



Procedure

A two-dimensional transthoracic echocardiogram with color flow and Doppler was performed.

Left Ventricle

The left ventricular size, thickness and function are normal. The left ventricular ejection fraction 
is

normal. No regional wall motion abnormalities noted.

Right Ventricle

The right ventricle is normal in size and function.

Atria

The left atrium is severely dilated. The right atrium is severely dilated. 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
ysmal.

Mitral Valve

There is mild mitral valve thickening. There is no mitral valve stenosis. There is mild to moderate m
itral

regurgitation.

Tricuspid Valve

There is mild tricuspid valve thickening. There is no tricuspid stenosis. There is mild tricuspid

regurgitation. Right ventricular systolic pressure is normal.

Aortic Valve

The aortic valve is not well visualized. There is moderate to severe aortic valve thickening. There i
s

moderate aortic sclerosis.;. Moderate valvular aortic stenosis. Mild aortic regurgitation.

Pulmonic Valve

The pulmonic valve is not well visualized.

Great Vessels

The aortic root is normal size.

Pericardium/Pleura

There is no pericardial effusion.

______________________________________________________________________________





Interpretation Summary

The left ventricular size, thickness and function are normal

The left ventricular ejection fraction is normal.

No regional wall motion abnormalities noted.

The left atrium is severely dilated.

The right atrium is severely dilated.

The aortic valve is not well visualized.

There is moderate to severe aortic valve thickening.

There is moderate aortic sclerosis.;

Moderate valvular aortic stenosis.

There is mild tricuspid regurgitation.

Right ventricular systolic pressure is normal.

There is mild to moderate mitral regurgitation.

Mild aortic regurgitation.

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





MD Rivas Silvestre 2019 04:19 PM

## 2019-11-27 NOTE — EKG
Test Reason : 

Blood Pressure : ***/*** mmHG

Vent. Rate : 066 BPM     Atrial Rate : 069 BPM

   P-R Int : 000 ms          QRS Dur : 142 ms

    QT Int : 448 ms       P-R-T Axes : 000 089 013 degrees

   QTc Int : 469 ms

 

ATRIAL FIBRILLATION

RIGHT BUNDLE BRANCH BLOCK

ABNORMAL ECG

WHEN COMPARED WITH ECG OF 05-FEB-2018 11:11,

NO SIGNIFICANT CHANGE WAS FOUND

Confirmed by AWILDA MCRAE, BRYSON ([id number]) on 11/27/2019 10:47:44 AM

 

Referred By:             Confirmed By:BRYSON KAISER MD

## 2019-11-27 NOTE — CON.CARD
Consult


Consult Specialty:: Cardiology


Reason for Consultation:: syncope





- History of Present Illness


History of Present Illness: 





This is a 88-year-old man with a past medical history of Atrial Fibrillation, 

CAD, Lymphoma,Dementia. Who presents to the ED with family for syncopal episode 

this morning. Patient has Dementia unable to provide HPI. Per Ed record: 

Patient was in his usual state of health which includes being active and verbal 

with some baseline dementia, while seated at the table this morning for 

breakfast had increased tonicity followed by loss of consciousness for about 2 

minutes, no fall or injury, returned to alertness with some complaint of 

headache followed by vomiting, no complaints of chest pain or palpitations.  

Patient returned to baseline mental status but with notably increased fatigue 

throughout the day, brought in by family for evaluation.





Normal diet recently, no recent fevers or chills, no recent falls or 

cardiopulmonary complaints.





ED course was noted for:





(1) Head CT- no acute intracranial pathology


(2) EKG- Afib 69BPM, RBBB


(3)








- History Source


History Provided By: Patient, Medical Record


Limitations to Obtaining History: Dementia





- Past Medical History


CNS: Yes: Dementia


Cardio/Vascular: Yes: AFIB (chronic), CAD, HTN


Pulmonary: Yes: Other (?describes history of asbestosis. )


Gastrointestinal: Yes: Other


Renal/: Yes: BPH





- Alcohol/Substance Use


Hx Alcohol Use: No


History of Substance Use: reports: None





- Smoking History


Smoking history: Former smoker


Have you smoked in the past 12 months: No


Aproximately how many cigarettes per day: 10


If you are a former smoker, when did you quit?: 50 YRS AGO





- Social History


Usual Living Arrangement: With Spouse


ADL: Family Assistance


History of Recent Travel: No





Home Medications





- Allergies


Allergies/Adverse Reactions: 


 Allergies











Allergy/AdvReac Type Severity Reaction Status Date / Time


 


cefepime Allergy   Verified 02/05/18 09:56














- Home Medications


Home Medications: 


Ambulatory Orders





Ibrutinib [Imbruvica] 140 mg PO DAILY 11/26/19 


Losartan Potassium 50 mg PO DAILY 11/26/19 


Pantoprazole Sodium 40 mg PO DAILY 11/26/19 


Tamsulosin HCl [Flomax] 0.4 mg PO DAILY 11/26/19 











Review of Systems





- Review of Systems


Constitutional: reports: No Symptoms


Eyes: reports: No Symptoms


HENT: reports: No Symptoms


Neck: reports: No Symptoms


Cardiovascular: reports: No Symptoms


Gastrointestinal: reports: No Symptoms


Genitourinary: reports: No Symptoms


Breasts: reports: No Symptoms Reported


Musculoskeletal: reports: No Symptoms


Integumentary: reports: No Symptoms


Neurological: reports: Syncope


Endocrine: reports: No Symptoms


Hematology/Lymphatic: reports: No Symptoms


Psychiatric: reports: No Symptoms


Vital Signs: 


 Vital Signs











Temperature  98.0 F   11/27/19 09:14


 


Pulse Rate  79   11/27/19 09:14


 


Respiratory Rate  18   11/27/19 09:14


 


Blood Pressure  161/90   11/27/19 09:14


 


O2 Sat by Pulse Oximetry (%)  97   11/27/19 09:14











Constitutional: Yes: Well Nourished, No Distress, Calm


Eyes: Yes: WNL, Conjunctiva Clear, EOM Intact


HENT: Yes: WNL, Atraumatic, Normocephalic


Neck: Yes: WNL, Supple, Trachea Midline


Respiratory: Yes: WNL, Regular, CTA Bilaterally


Gastrointestinal: Yes: WNL, Normal Bowel Sounds


Renal/: Yes: WNL


Cardiovascular: Yes: WNL, Regular Rate and Rhythm


Heart Sounds: Yes: S1, S2


Murmur: Yes: Systolic Murmur


Musculoskeletal: Yes: WNL


Extremities: Yes: WNL


Integumentary: Yes: WNL


Neurological: Yes: WNL, Alert, Oriented


...Motor Strength: WNL


Psychiatric: Yes: WNL, Alert, Oriented





- Other Data


Labs, Other Data: 


 CBC, BMP





 11/27/19 06:36 





 11/27/19 06:36 





 INR, PTT











INR  1.12  (0.83-1.09)  H  11/26/19  15:33    








 Troponin, BNP











  11/26/19 11/26/19





  15:33 18:35


 


Troponin I  < 0.02  < 0.02








 Troponin, BNP











  11/26/19 11/26/19





  15:33 18:35


 


Troponin I  < 0.02  < 0.02














Imaging





- Results


Chest X-ray: Image Reviewed (atelectasis)


EKG: Image Reviewed (af rbbb)





Problem List





- Problems


(1) A-fib


Code(s): I48.91 - UNSPECIFIED ATRIAL FIBRILLATION   





(2) Dementia


Code(s): F03.90 - UNSPECIFIED DEMENTIA WITHOUT BEHAVIORAL DISTURBANCE   





(3) Emesis


Code(s): R11.10 - VOMITING, UNSPECIFIED   





(4) Syncope


Code(s): R55 - SYNCOPE AND COLLAPSE   





(5) Vertigo


Code(s): R42 - DIZZINESS AND GIDDINESS   





(6) Abscess or cellulitis of chest wall


Code(s): [id number] -    





(7) Acute blood loss anemia


Code(s): D62 - ACUTE POSTHEMORRHAGIC ANEMIA   





(8) Admission for blood transfusion, without reported diagnosis


Code(s): Z51.89 - ENCOUNTER FOR OTHER SPECIFIED AFTERCARE   





(9) Amaurosis fugax


Code(s): G45.3 - AMAUROSIS FUGAX   





(10) Anemia


Code(s): D64.9 - ANEMIA, UNSPECIFIED   


Qualifiers: 


   Anemia type: unspecified type   Qualified Code(s): D64.9 - Anemia, 

unspecified   





(11) Aortic aneurysm


Code(s): I71.9 - AORTIC ANEURYSM OF UNSPECIFIED SITE, WITHOUT RUPTURE   





(12) Atrial fibrillation, chronic


Code(s): I48.2 - CHRONIC ATRIAL FIBRILLATION * DO NOT USE *   





(13) Back pain


Code(s): M54.9 - DORSALGIA, UNSPECIFIED   


Qualifiers: 


   Back pain location: low back pain   Back pain laterality: right   Sciatica 

presence: without sciatica   Qualified Code(s): M54.5 - Low back pain   





(14) CHF exacerbation


Code(s): I50.9 - HEART FAILURE, UNSPECIFIED   





(15) CLL (chronic lymphocytic leukemia)


Code(s): C91.10 - CHRONIC LYMPHOCYTIC LEUK OF B-CELL TYPE NOT ACHIEVE REMIS   





(16) Chest pain


Code(s): R07.9 - CHEST PAIN, UNSPECIFIED   





(17) Cough


Code(s): R05 - COUGH   





(18) DVT prophylaxis


Code(s): [id number] -    





(19) Decreased vision


Code(s): H54.7 - UNSPECIFIED VISUAL LOSS   





(20) Elbow contusion


Code(s): S50.00XA - CONTUSION OF UNSPECIFIED ELBOW, INITIAL ENCOUNTER   





(21) GI bleed


Code(s): K92.2 - GASTROINTESTINAL HEMORRHAGE, UNSPECIFIED   





(22) Headache


Code(s): R51 - HEADACHE   


Qualifiers: 


   Headache type: unspecified   Headache chronicity pattern: acute headache   

Intractability: not intractable   Qualified Code(s): R51 - Headache   





(23) Hypertension


Code(s): I10 - ESSENTIAL (PRIMARY) HYPERTENSION   


Qualifiers: 


   Hypertension type: essential hypertension   Qualified Code(s): I10 - 

Essential (primary) hypertension   





(24) Hyperviscosity syndrome


Code(s): [id number] -    





(25) Hyponatremia


Code(s): E87.1 - HYPO-OSMOLALITY AND HYPONATREMIA   





(26) LVH (left ventricular hypertrophy)


Code(s): I51.7 - CARDIOMEGALY   





(27) Leg pain, right


Code(s): M79.604 - PAIN IN RIGHT LEG   





(28) Mucositis due to chemotherapy


Code(s): K12.31 - ORAL MUCOSITIS (ULCERATIVE) DUE TO ANTINEOPLASTIC THERAPY   





(29) Nasal bleeding


Code(s): R04.0 - EPISTAXIS   





(30) Nasal crusting


Code(s): J34.89 - OTHER SPECIFIED DISORDERS OF NOSE AND NASAL SINUSES   





(31) Pancytopenia


Code(s): D61.818 - OTHER PANCYTOPENIA   





(32) Pancytopenia, acquired


Code(s): D61.818 - OTHER PANCYTOPENIA   





(33) Pneumonia


Code(s): J18.9 - PNEUMONIA, UNSPECIFIED ORGANISM   





(34) Sebaceous cyst


Code(s): L72.3 - SEBACEOUS CYST   





(35) Temporal arteritis syndrome


Code(s): M31.6 - OTHER GIANT CELL ARTERITIS   





(36) Thrombocytopenia


Code(s): D69.6 - THROMBOCYTOPENIA, UNSPECIFIED   





(37) Tremor


Code(s): R25.1 - TREMOR, UNSPECIFIED   





Assessment/Plan


Imp


syncope


af


rbbb


dementia


htn


Lymphoma








Plan;








telemetry


echo


neuro eval


c. duplex pending


24 Holter

## 2019-11-27 NOTE — PN
Progress Note, Physician


Chief Complaint: 





Syncope


Dementia


History of Present Illness: 





Previous notes and events reviewed


awake and alert, pleasantly confused


NAD


denies chest pain or SOB








- Current Medication List


Current Medications: 


Active Medications





Losartan Potassium (Cozaar -)  50 mg PO DAILY [facility]


   Last Admin: 11/27/19 09:32 Dose:  50 mg


Non-Formulary Medication (Ibrutinib [Imbruvica])  140 mg PO DAILY [facility]


Pantoprazole Sodium (Protonix -)  40 mg PO DAILY [facility]


   Last Admin: 11/27/19 09:32 Dose:  40 mg


Tamsulosin HCl (Flomax -)  0.4 mg PO DAILY@0830 [facility]


   Last Admin: 11/27/19 09:32 Dose:  0.4 mg











- Objective


Vital Signs: 


 Vital Signs











Temperature  98.0 F   11/27/19 09:14


 


Pulse Rate  79   11/27/19 09:14


 


Respiratory Rate  18   11/27/19 09:14


 


Blood Pressure  161/90   11/27/19 09:14


 


O2 Sat by Pulse Oximetry (%)  97   11/27/19 09:14











Constitutional: Yes: No Distress, Calm


Eyes: Yes: Conjunctiva Clear


HENT: Yes: Atraumatic


Cardiovascular: Yes: Regular Rate and Rhythm


Respiratory: Yes: Regular, CTA Bilaterally


Gastrointestinal: Yes: Normal Bowel Sounds, Soft


Musculoskeletal: Yes: Muscle Weakness


Extremities: Yes: WNL


Edema: No


Neurological: Yes: Alert, Confusion, Pre-Existing Deficit


Psychiatric: Yes: Alert


Labs: 


 CBC, BMP





 11/27/19 06:36 





 11/27/19 06:36 





 INR, PTT











INR  1.12  (0.83-1.09)  H  11/26/19  15:33    














Problem List





- Problems


(1) A-fib


Assessment/Plan: 


-Cardiology consult


-not on AC?


-


Code(s): I48.91 - UNSPECIFIED ATRIAL FIBRILLATION   





(2) Dementia


Assessment/Plan: 


-Neurology consult





Code(s): F03.90 - UNSPECIFIED DEMENTIA WITHOUT BEHAVIORAL DISTURBANCE   





(3) Syncope


Assessment/Plan: 


-Neurology and Cardiology consult


-fall precaution


-Carotid US shows mild atherosclerotic disease with no evidence of 

hemodynamically significant stenoses


-lipid profile


-tele monitoring


-trop neg x 2


-Echocardiogram EF is normal, no pericardial effusion


-Head CT scan no CT evidence of intracranial pathology, mild periventricular 

chronic microvascular changes small amount fluid within the right mastoid air 

cell


Code(s): R55 - SYNCOPE AND COLLAPSE   





(4) Hypertension


Assessment/Plan: 


-Losartan


-low Na diet


Code(s): I10 - ESSENTIAL (PRIMARY) HYPERTENSION   


Qualifiers: 


   Hypertension type: essential hypertension   Qualified Code(s): I10 - 

Essential (primary) hypertension   





(5) CLL (chronic lymphocytic leukemia)


Assessment/Plan: 


-Oncology consult


Code(s): C91.10 - CHRONIC LYMPHOCYTIC LEUK OF B-CELL TYPE NOT ACHIEVE REMIS   





Assessment/Plan





see problem list 


dvt ppx

## 2019-11-28 LAB
ALBUMIN SERPL-MCNC: 3.1 G/DL (ref 3.4–5)
ALP SERPL-CCNC: 50 U/L (ref 45–117)
ALT SERPL-CCNC: 22 U/L (ref 13–61)
ANION GAP SERPL CALC-SCNC: 7 MMOL/L (ref 8–16)
AST SERPL-CCNC: 37 U/L (ref 15–37)
BILIRUB SERPL-MCNC: 0.9 MG/DL (ref 0.2–1)
BUN SERPL-MCNC: 27.2 MG/DL (ref 7–18)
CALCIUM SERPL-MCNC: 9 MG/DL (ref 8.5–10.1)
CHLORIDE SERPL-SCNC: 106 MMOL/L (ref 98–107)
CO2 SERPL-SCNC: 27 MMOL/L (ref 21–32)
CREAT SERPL-MCNC: 1 MG/DL (ref 0.55–1.3)
DEPRECATED RDW RBC AUTO: 14.6 % (ref 11.9–15.9)
GLUCOSE SERPL-MCNC: 88 MG/DL (ref 74–106)
HCT VFR BLD CALC: 37.3 % (ref 35.4–49)
HGB BLD-MCNC: 12.4 GM/DL (ref 11.7–16.9)
MCH RBC QN AUTO: 33.5 PG (ref 25.7–33.7)
MCHC RBC AUTO-ENTMCNC: 33.3 G/DL (ref 32–35.9)
MCV RBC: 100.6 FL (ref 80–96)
PLATELET # BLD AUTO: 52 K/MM3 (ref 134–434)
PMV BLD: 13.4 FL (ref 7.5–11.1)
POTASSIUM SERPLBLD-SCNC: 3.7 MMOL/L (ref 3.5–5.1)
PROT SERPL-MCNC: 8.8 G/DL (ref 6.4–8.2)
RBC # BLD AUTO: 3.71 M/MM3 (ref 4–5.6)
SODIUM SERPL-SCNC: 140 MMOL/L (ref 136–145)
WBC # BLD AUTO: 5.4 K/MM3 (ref 4–10)

## 2019-11-28 RX ADMIN — Medication SCH: at 23:43

## 2019-11-28 RX ADMIN — PANTOPRAZOLE SODIUM SCH: 40 TABLET, DELAYED RELEASE ORAL at 11:06

## 2019-11-28 RX ADMIN — POLYVINYL ALCOHOL SCH: 14 SOLUTION/ DROPS OPHTHALMIC at 23:43

## 2019-11-28 RX ADMIN — LEVETIRACETAM SCH: 500 TABLET, FILM COATED ORAL at 11:05

## 2019-11-28 RX ADMIN — POLYVINYL ALCOHOL SCH DROP: 14 SOLUTION/ DROPS OPHTHALMIC at 18:09

## 2019-11-28 RX ADMIN — LEVETIRACETAM SCH: 500 TABLET, FILM COATED ORAL at 23:43

## 2019-11-28 RX ADMIN — TAMSULOSIN HYDROCHLORIDE SCH: 0.4 CAPSULE ORAL at 11:04

## 2019-11-28 RX ADMIN — LOSARTAN POTASSIUM SCH: 50 TABLET, FILM COATED ORAL at 11:05

## 2019-11-28 NOTE — PN
Progress Note, Physician


Chief Complaint: 





CONFUSED


SAFET RESTRAINT VEST APPLIED FOR PATIENTS FALL RISK





- Current Medication List


Current Medications: 


Active Medications





Levetiracetam (Keppra -)  500 mg PO BID [facility]


   Last Admin: 11/27/19 22:03 Dose:  500 mg


Losartan Potassium (Cozaar -)  100 mg PO DAILY [facility]


Metoprolol Tartrate (Lopressor Injection -)  5 mg IVPUSH Q4H PRN


   PRN Reason: HYPERTENSION


Non-Formulary Medication (Ibrutinib [Imbruvica])  140 mg PO HS [facility]


Pantoprazole Sodium (Protonix -)  40 mg PO DAILY [facility]


   Last Admin: 11/27/19 09:32 Dose:  40 mg


Tamsulosin HCl (Flomax -)  0.4 mg PO DAILY@0830 [facility]


   Last Admin: 11/27/19 09:32 Dose:  0.4 mg











- Objective


Vital Signs: 


 Vital Signs











Temperature  98.0 F   11/28/19 09:01


 


Pulse Rate  92 H  11/28/19 09:01


 


Respiratory Rate  20   11/28/19 09:01


 


Blood Pressure  185/120 H  11/28/19 09:01


 


O2 Sat by Pulse Oximetry (%)  95   11/27/19 21:00











Constitutional: Yes: Moderate Distress


Cardiovascular: Yes: Regular Rate and Rhythm


Respiratory: Yes: WNL


Gastrointestinal: Yes: Soft


Genitourinary: Yes: Incontinence


Musculoskeletal: Yes: Muscle Weakness


Edema: No


Integumentary: Yes: WNL


Wound/Incision: Yes: Clean/Dry


Neurological: Yes: Confusion


Psychiatric: Yes: Agitated


Labs: 


 CBC, BMP





 11/28/19 06:20 





 11/28/19 06:20 





 INR, PTT











INR  1.12  (0.83-1.09)  H  11/26/19  15:33    














Problem List





- Problems


(1) Neoplasm of right eye


Code(s): D49.89 - NEOPLASM OF UNSPECIFIED BEHAVIOR OF OTHER SPECIFIED SITES   





(2) A-fib


Code(s): I48.91 - UNSPECIFIED ATRIAL FIBRILLATION   





(3) Dementia


Code(s): F03.90 - UNSPECIFIED DEMENTIA WITHOUT BEHAVIORAL DISTURBANCE   





(4) Syncope


Code(s): R55 - SYNCOPE AND COLLAPSE   





(5) Vertigo


Code(s): R42 - DIZZINESS AND GIDDINESS   





(6) Anemia


Code(s): D64.9 - ANEMIA, UNSPECIFIED   


Qualifiers: 


   Anemia type: unspecified type   Qualified Code(s): D64.9 - Anemia, 

unspecified   





Assessment/Plan





RESTRAINT VEST FOR SAFETY


METORPROLOL IV 5MG Q6HRS


HOLD FOR SBP LESS THAN 110 OR HR LESS THAN 60/MIN


ENALAPRILAT IV X 1 


NEURO AND PSYCH EVAL


MRI BRAIN PENDING

## 2019-11-28 NOTE — PN
Progress Note, Physician


Chief Complaint: 


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





History of Present Illness: 





Confused and agitated in posey vest, pulled off telemetry.





- Current Medication List


Current Medications: 


Active Medications





Enalaprilat (Vasotec Injection -)  2.5 mg IVPB ONCE ONE


   Stop: 11/28/19 10:32


Levetiracetam (Keppra -)  500 mg PO BID [facility]


   Last Admin: 11/27/19 22:03 Dose:  500 mg


Losartan Potassium (Cozaar -)  100 mg PO DAILY [facility]


Metoprolol Tartrate (Lopressor Injection -)  5 mg IVPUSH Q4H PRN


   PRN Reason: HYPERTENSION


Non-Formulary Medication (Ibrutinib [Imbruvica])  140 mg PO HS [facility]


Pantoprazole Sodium (Protonix -)  40 mg PO DAILY [facility]


   Last Admin: 11/27/19 09:32 Dose:  40 mg


Tamsulosin HCl (Flomax -)  0.4 mg PO DAILY@0830 [facility]


   Last Admin: 11/27/19 09:32 Dose:  0.4 mg











- Objective


Vital Signs: 


 Vital Signs











Temperature  98.0 F   11/28/19 09:01


 


Pulse Rate  92 H  11/28/19 09:01


 


Respiratory Rate  20   11/28/19 09:01


 


Blood Pressure  185/120 H  11/28/19 09:01


 


O2 Sat by Pulse Oximetry (%)  95   11/27/19 21:00











Constitutional: Yes: Thin


Neck: Yes: Supple


Cardiovascular: Yes: Pulse Irregular


Respiratory: Yes: Regular, Diminished


Gastrointestinal: Yes: Soft, Hypoactive Bowel Sounds


Edema: No


Labs: 


 CBC, BMP





 11/28/19 06:20 





 11/28/19 06:20 





 INR, PTT











INR  1.12  (0.83-1.09)  H  11/26/19  15:33    














- ....Imaging


EKG: Report Reviewed (Tele: Monitor off)





Assessment/Plan


11/27/2019 Echo: Normal RV and LV size and fxn, severe ARRON, mod AS MG 12 mmHg, 

mild-mod MR, mod AS MG 12 mmHg, mild TR RVSP 27 mmHg, mild-mod MR, mild AR


11/27/2019 Carotid US: No sig stenosis





Problem List





- Problems


(1) A-fib


Code(s): I48.91 - UNSPECIFIED ATRIAL FIBRILLATION   





(2) Dementia


Code(s): F03.90 - UNSPECIFIED DEMENTIA WITHOUT BEHAVIORAL DISTURBANCE   





(3) Emesis


Code(s): R11.10 - VOMITING, UNSPECIFIED   





(4) Syncope


Code(s): R55 - SYNCOPE AND COLLAPSE   





(5) Vertigo


Code(s): R42 - DIZZINESS AND GIDDINESS   





(6) Abscess or cellulitis of chest wall


Code(s): [id number] -    





(7) Acute blood loss anemia


Code(s): D62 - ACUTE POSTHEMORRHAGIC ANEMIA   





(8) Admission for blood transfusion, without reported diagnosis


Code(s): Z51.89 - ENCOUNTER FOR OTHER SPECIFIED AFTERCARE   





(9) Amaurosis fugax


Code(s): G45.3 - AMAUROSIS FUGAX   





(10) Anemia


Code(s): D64.9 - ANEMIA, UNSPECIFIED   


Qualifiers: 


   Anemia type: unspecified type   Qualified Code(s): D64.9 - Anemia, 

unspecified   





(11) Aortic aneurysm


Code(s): I71.9 - AORTIC ANEURYSM OF UNSPECIFIED SITE, WITHOUT RUPTURE   





(12) Atrial fibrillation, chronic


Code(s): I48.2 - CHRONIC ATRIAL FIBRILLATION * DO NOT USE *   





(13) Back pain


Code(s): M54.9 - DORSALGIA, UNSPECIFIED   


Qualifiers: 


   Back pain location: low back pain   Back pain laterality: right   Sciatica 

presence: without sciatica   Qualified Code(s): M54.5 - Low back pain   





(14) CHF exacerbation


Code(s): I50.9 - HEART FAILURE, UNSPECIFIED   





(15) CLL (chronic lymphocytic leukemia)


Code(s): C91.10 - CHRONIC LYMPHOCYTIC LEUK OF B-CELL TYPE NOT ACHIEVE REMIS   





(16) Chest pain


Code(s): R07.9 - CHEST PAIN, UNSPECIFIED   





(17) Cough


Code(s): R05 - COUGH   





(18) DVT prophylaxis


Code(s): RQU1698 -    





(19) Decreased vision


Code(s): H54.7 - UNSPECIFIED VISUAL LOSS   





(20) Elbow contusion


Code(s): S50.00XA - CONTUSION OF UNSPECIFIED ELBOW, INITIAL ENCOUNTER   





(21) GI bleed


Code(s): K92.2 - GASTROINTESTINAL HEMORRHAGE, UNSPECIFIED   





(22) Headache


Code(s): R51 - HEADACHE   


Qualifiers: 


   Headache type: unspecified   Headache chronicity pattern: acute headache   

Intractability: not intractable   Qualified Code(s): R51 - Headache   





(23) Hypertension


Code(s): I10 - ESSENTIAL (PRIMARY) HYPERTENSION   


Qualifiers: 


   Hypertension type: essential hypertension   Qualified Code(s): I10 - 

Essential (primary) hypertension   





(24) Hyperviscosity syndrome


Code(s): IOB2998 -    





(25) Hyponatremia


Code(s): E87.1 - HYPO-OSMOLALITY AND HYPONATREMIA   





(26) LVH (left ventricular hypertrophy)


Code(s): I51.7 - CARDIOMEGALY   





(27) Leg pain, right


Code(s): M79.604 - PAIN IN RIGHT LEG   





(28) Mucositis due to chemotherapy


Code(s): K12.31 - ORAL MUCOSITIS (ULCERATIVE) DUE TO ANTINEOPLASTIC THERAPY   





(29) Nasal bleeding


Code(s): R04.0 - EPISTAXIS   





(30) Nasal crusting


Code(s): J34.89 - OTHER SPECIFIED DISORDERS OF NOSE AND NASAL SINUSES   





(31) Pancytopenia


Code(s): D61.818 - OTHER PANCYTOPENIA   





(32) Pancytopenia, acquired


Code(s): D61.818 - OTHER PANCYTOPENIA   





(33) Pneumonia


Code(s): J18.9 - PNEUMONIA, UNSPECIFIED ORGANISM   





(34) Sebaceous cyst


Code(s): L72.3 - SEBACEOUS CYST   





(35) Temporal arteritis syndrome


Code(s): M31.6 - OTHER GIANT CELL ARTERITIS   





(36) Thrombocytopenia


Code(s): D69.6 - THROMBOCYTOPENIA, UNSPECIFIED   





(37) Tremor


Code(s): R25.1 - TREMOR, UNSPECIFIED   





Assessment/Plan


Imp


syncope


af


rbbb


dementia


htn


Lymphoma








Plan;


telemetry monitor if allowed


echo and carotid results reviewed 


neuro eval and MRI brain if cooperative


24 Holter if cooperates


IV Lopress and Vasotec as patient not taking oral intake, not on a/c due to 

fall risk, advanced dementia and GI bleed

## 2019-11-28 NOTE — EKG
Test Reason : 

Blood Pressure : ***/*** mmHG

Vent. Rate : 091 BPM     Atrial Rate : 083 BPM

   P-R Int : 000 ms          QRS Dur : 140 ms

    QT Int : 390 ms       P-R-T Axes : 000 129 -15 degrees

   QTc Int : 479 ms

 

ATRIAL FIBRILLATION

RIGHT BUNDLE BRANCH BLOCK

T WAVE ABNORMALITY, CONSIDER INFERIOR ISCHEMIA

ABNORMAL ECG

WHEN COMPARED WITH ECG OF 26-NOV-2019 15:50,

NO SIGNIFICANT CHANGE WAS FOUND

Confirmed by RUDOLPH MCRAE, ALCIDES ([id number]) on 11/28/2019 11:25:01 AM

 

Referred By: KUNAL DUBOIS           Confirmed By:ALCIDES GALDAMEZ MD

## 2019-11-29 RX ADMIN — POLYVINYL ALCOHOL SCH DROP: 14 SOLUTION/ DROPS OPHTHALMIC at 09:07

## 2019-11-29 RX ADMIN — LOSARTAN POTASSIUM SCH MG: 50 TABLET, FILM COATED ORAL at 09:06

## 2019-11-29 RX ADMIN — POLYVINYL ALCOHOL SCH DROP: 14 SOLUTION/ DROPS OPHTHALMIC at 14:14

## 2019-11-29 RX ADMIN — POLYVINYL ALCOHOL SCH DROP: 14 SOLUTION/ DROPS OPHTHALMIC at 17:51

## 2019-11-29 RX ADMIN — TAMSULOSIN HYDROCHLORIDE SCH MG: 0.4 CAPSULE ORAL at 09:06

## 2019-11-29 RX ADMIN — PANTOPRAZOLE SODIUM SCH MG: 40 TABLET, DELAYED RELEASE ORAL at 09:06

## 2019-11-29 RX ADMIN — LEVETIRACETAM SCH MG: 500 TABLET, FILM COATED ORAL at 21:21

## 2019-11-29 RX ADMIN — POLYVINYL ALCOHOL SCH DROP: 14 SOLUTION/ DROPS OPHTHALMIC at 21:24

## 2019-11-29 RX ADMIN — LEVETIRACETAM SCH MG: 500 TABLET, FILM COATED ORAL at 09:06

## 2019-11-29 RX ADMIN — Medication SCH MG: at 21:24

## 2019-11-29 NOTE — PN
Progress Note, Physician


Chief Complaint: 


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





History of Present Illness: 





Much improved mental status while family by bedside feeding him, back on 

telemetry.





- Current Medication List


Current Medications: 


Active Medications





Artificial Tears (Artificial Tears)  1 drop OU QID [facility]


   Last Admin: 11/29/19 09:07 Dose:  1 drop


Levetiracetam (Keppra -)  500 mg PO BID [facility]


   Last Admin: 11/29/19 09:06 Dose:  500 mg


Losartan Potassium (Cozaar -)  100 mg PO DAILY [facility]


   Last Admin: 11/29/19 09:06 Dose:  100 mg


Metoprolol Tartrate (Lopressor Injection -)  5 mg IVPUSH Q4H PRN


   PRN Reason: HYPERTENSION


Non-Formulary Medication (Ibrutinib [Imbruvica])  140 mg PO HS [facility]


   Last Admin: 11/28/19 23:43 Dose:  Not Given


Pantoprazole Sodium (Protonix -)  40 mg PO DAILY [facility]


   Last Admin: 11/29/19 09:06 Dose:  40 mg


Tamsulosin HCl (Flomax -)  0.4 mg PO DAILY@0830 [facility]


   Last Admin: 11/29/19 09:06 Dose:  0.4 mg











- Objective


Vital Signs: 


 Vital Signs











Temperature  98.0 F   11/29/19 08:06


 


Pulse Rate  79   11/29/19 08:06


 


Respiratory Rate  18   11/29/19 08:06


 


Blood Pressure  132/79   11/29/19 08:06


 


O2 Sat by Pulse Oximetry (%)  92 L  11/28/19 21:00











Constitutional: Yes: No Distress, Calm


Neck: Yes: Supple


Cardiovascular: Yes: Pulse Irregular


Respiratory: Yes: Regular, CTA Bilaterally


Gastrointestinal: Yes: Normal Bowel Sounds, Soft


Edema: No


Labs: 


 CBC, BMP





 11/28/19 06:20 





 11/28/19 06:20 





 INR, PTT











INR  1.12  (0.83-1.09)  H  11/26/19  15:33    














- ....Imaging


EKG: Report Reviewed (Tele: Rate-controlled afib w/o pauses)





Assessment/Plan


11/27/2019 Echo: Normal RV and LV size and fxn, severe ARRON, mod AS MG 12 mmHg, 

mild-mod MR, mod AS MG 12 mmHg, mild TR RVSP 27 mmHg, mild-mod MR, mild AR


11/27/2019 Carotid US: No sig stenosis





Problem List





- Problems


(1) A-fib


Code(s): I48.91 - UNSPECIFIED ATRIAL FIBRILLATION   





(2) Dementia


Code(s): F03.90 - UNSPECIFIED DEMENTIA WITHOUT BEHAVIORAL DISTURBANCE   





(3) Emesis


Code(s): R11.10 - VOMITING, UNSPECIFIED   





(4) Syncope


Code(s): R55 - SYNCOPE AND COLLAPSE   





(5) Vertigo


Code(s): R42 - DIZZINESS AND GIDDINESS   





(6) Abscess or cellulitis of chest wall


Code(s): [id number] -    





(7) Acute blood loss anemia


Code(s): D62 - ACUTE POSTHEMORRHAGIC ANEMIA   





(8) Admission for blood transfusion, without reported diagnosis


Code(s): Z51.89 - ENCOUNTER FOR OTHER SPECIFIED AFTERCARE   





(9) Amaurosis fugax


Code(s): G45.3 - AMAUROSIS FUGAX   





(10) Anemia


Code(s): D64.9 - ANEMIA, UNSPECIFIED   


Qualifiers: 


   Anemia type: unspecified type   Qualified Code(s): D64.9 - Anemia, 

unspecified   





(11) Aortic aneurysm


Code(s): I71.9 - AORTIC ANEURYSM OF UNSPECIFIED SITE, WITHOUT RUPTURE   





(12) Atrial fibrillation, chronic


Code(s): I48.2 - CHRONIC ATRIAL FIBRILLATION * DO NOT USE *   





(13) Back pain


Code(s): M54.9 - DORSALGIA, UNSPECIFIED   


Qualifiers: 


   Back pain location: low back pain   Back pain laterality: right   Sciatica 

presence: without sciatica   Qualified Code(s): M54.5 - Low back pain   





(14) CHF exacerbation


Code(s): I50.9 - HEART FAILURE, UNSPECIFIED   





(15) CLL (chronic lymphocytic leukemia)


Code(s): C91.10 - CHRONIC LYMPHOCYTIC LEUK OF B-CELL TYPE NOT ACHIEVE REMIS   





(16) Chest pain


Code(s): R07.9 - CHEST PAIN, UNSPECIFIED   





(17) Cough


Code(s): R05 - COUGH   





(18) DVT prophylaxis


Code(s): [id number] -    





(19) Decreased vision


Code(s): H54.7 - UNSPECIFIED VISUAL LOSS   





(20) Elbow contusion


Code(s): S50.00XA - CONTUSION OF UNSPECIFIED ELBOW, INITIAL ENCOUNTER   





(21) GI bleed


Code(s): K92.2 - GASTROINTESTINAL HEMORRHAGE, UNSPECIFIED   





(22) Headache


Code(s): R51 - HEADACHE   


Qualifiers: 


   Headache type: unspecified   Headache chronicity pattern: acute headache   

Intractability: not intractable   Qualified Code(s): R51 - Headache   





(23) Hypertension


Code(s): I10 - ESSENTIAL (PRIMARY) HYPERTENSION   


Qualifiers: 


   Hypertension type: essential hypertension   Qualified Code(s): I10 - 

Essential (primary) hypertension   





(24) Hyperviscosity syndrome


Code(s): [id number] -    





(25) Hyponatremia


Code(s): E87.1 - HYPO-OSMOLALITY AND HYPONATREMIA   





(26) LVH (left ventricular hypertrophy)


Code(s): I51.7 - CARDIOMEGALY   





(27) Leg pain, right


Code(s): M79.604 - PAIN IN RIGHT LEG   





(28) Mucositis due to chemotherapy


Code(s): K12.31 - ORAL MUCOSITIS (ULCERATIVE) DUE TO ANTINEOPLASTIC THERAPY   





(29) Nasal bleeding


Code(s): R04.0 - EPISTAXIS   





(30) Nasal crusting


Code(s): J34.89 - OTHER SPECIFIED DISORDERS OF NOSE AND NASAL SINUSES   





(31) Pancytopenia


Code(s): D61.818 - OTHER PANCYTOPENIA   





(32) Pancytopenia, acquired


Code(s): D61.818 - OTHER PANCYTOPENIA   





(33) Pneumonia


Code(s): J18.9 - PNEUMONIA, UNSPECIFIED ORGANISM   





(34) Sebaceous cyst


Code(s): L72.3 - SEBACEOUS CYST   





(35) Temporal arteritis syndrome


Code(s): M31.6 - OTHER GIANT CELL ARTERITIS   





(36) Thrombocytopenia


Code(s): D69.6 - THROMBOCYTOPENIA, UNSPECIFIED   





(37) Tremor


Code(s): R25.1 - TREMOR, UNSPECIFIED   





Assessment/Plan


Imp


syncope


af


rbbb


dementia


htn


Lymphoma








Plan;


telemetry monitor as allowed


echo and carotid results reviewed 


reorient with family assistance


24 Holter if cooperates


Resumed losartan 100 qd, IV Lopressor and Vasotec as needed, not on a/c due to 

fall risk, advanced dementia and GI bleed

## 2019-11-29 NOTE — CON.PSY
Psychiatry Consult


Chief Complaint: 88 Year old male seen for Psych eval for acute confusion and 

aggressive behaviour. Staff report that he is much better , coherent and 

cooperative3. No repoprts of any aggressice behaviour. Eating Dinner being fed 

by his wife. She reports that he gurvinder back to himself.





- Previous Psychiatric Treatment


Outpatient: None


Inpatient: None





- Previous Substance Abuse Treatment


Outpatient: None


Inpatient: None





- Current Medications


Current Medications: 


Active Medications





Artificial Tears (Artificial Tears)  1 drop OU QID [facility]


   Last Admin: 19 14:14 Dose:  1 drop


Levetiracetam (Keppra -)  500 mg PO BID [facility]


   Last Admin: 19 09:06 Dose:  500 mg


Losartan Potassium (Cozaar -)  100 mg PO DAILY [facility]


   Last Admin: 19 09:06 Dose:  100 mg


Metoprolol Tartrate (Lopressor Injection -)  5 mg IVPUSH Q4H PRN


   PRN Reason: HYPERTENSION


Non-Formulary Medication (Ibrutinib [Imbruvica])  140 mg PO HS [facility]


   Last Admin: 19 23:43 Dose:  Not Given


Pantoprazole Sodium (Protonix -)  40 mg PO DAILY [facility]


   Last Admin: 19 09:06 Dose:  40 mg


Tamsulosin HCl (Flomax -)  0.4 mg PO DAILY@0830 [facility]


   Last Admin: 19 09:06 Dose:  0.4 mg











- Allergies


Allergies: 


 Allergies











Allergy/AdvReac Type Severity Reaction Status Date / Time


 


cefepime Allergy   Verified 18 09:56














- Current Living Status


Usual Living Arrangement: With Significant Other





- Current Mental Status Evaluation


Appearance: Well Groomed


Attitude: Cooperative





- Affect


Affect: Full Range


Appropriateness: Appropriate to Content





- Mood


Mood: Euthymic





- Speech/Language


Expressive: Coherent





- Psychomotor Activity


Psychomotor Activity: Normal





- Thought Process


Thought Process: Intact





- Thought Content


Hallucinations: Absent


Delusions: Absent





- Self Perception


Self Perception: No Impairment





- Cognition


Attention: Alert


Orientation: Time


Memory, Immediate Recall: Intact


Memory, Short Term: 2/3


Memory, Remote with Promptin/3





- Concentration


Serial Sevens Intact: No


Simple Calculations Intact: Yes





- Insight


Insight: Intact





- Impulse Control


Impulse Control: Minimally Impaired





- Suicidal Ideation


Suicidal Ideation: No





- Homicidal Ideation


Homicidal Ideation: No





Assessment/Plan


1) Patients acute OMS resolved. no psych meds needed.

## 2019-11-29 NOTE — DS
Physical Examination


Vital Signs: 


 Vital Signs











Temperature  98.0 F   11/29/19 08:06


 


Pulse Rate  79   11/29/19 08:06


 


Respiratory Rate  18   11/29/19 08:06


 


Blood Pressure  132/79   11/29/19 08:06


 


O2 Sat by Pulse Oximetry (%)  96   11/29/19 09:00











Constitutional: Yes: Calm


Cardiovascular: Yes: Regular Rate and Rhythm, S1, S2


Respiratory: Yes: CTA Bilaterally


Gastrointestinal: Yes: Normal Bowel Sounds, Soft


Edema: No


Neurological: Yes: Alert


Labs: 


 CBC, BMP





 11/28/19 06:20 





 11/28/19 06:20 











Discharge Summary


Problems reviewed: Yes


Reason For Visit: SYNCOPE VERTIGO


Current Active Problems





A-fib (Acute)


Dementia (Acute)


Emesis (Acute)


Neoplasm of right eye (Acute)


Syncope (Acute)


Vertigo (Acute)








Other Procedures: brain MRI no acute infacrt.  echo normal left ventricle 

function and ejection fraction.  carotid doppler no stenosis


Hospital Course: 


This is a 88-year-old man with a past medical history of Atrial Fibrillation, 

CAD, Lymphoma,Dementia. Who presents to the ED with family for syncopal episode 

this morning. Patient has Dementia unable to provide HPI. Per Ed record: 

Patient was in his usual state of health which includes being active and verbal 

with some baseline dementia, while seated at the table this morning for 

breakfast had increased tonicity followed by loss of consciousness for about 2 

minutes, no fall or injury, returned to alertness with some complaint of 

headache followed by vomiting, no complaints of chest pain or palpitations.  

Patient returned to baseline mental status but with notably increased fatigue 

throughout the day, brought in by family for evaluation.





Normal diet recently, no recent fevers or chills, no recent falls or 

cardiopulmonary complaints.





ED course was noted for:





(1) Head CT- no acute intracranial pathology


(2) EKG- Afib 69BPM, RBBB








seen by neurology and caradiology


started on keppra bid 


Condition: Fair





- Instructions


Referrals: 


Sara Saha MD [Primary Care Provider] - 1 Week


Disposition: VNS/HOME HEALTH CARE





- Home Medications


Comprehensive Discharge Medication List: 


Ambulatory Orders





Ibrutinib [Imbruvica] 140 mg PO DAILY 11/26/19 


Losartan Potassium 50 mg PO DAILY 11/26/19 


Pantoprazole Sodium 40 mg PO DAILY 11/26/19 


Tamsulosin HCl [Flomax] 0.4 mg PO DAILY 11/26/19

## 2019-11-30 VITALS — TEMPERATURE: 97.4 F | HEART RATE: 80 BPM | DIASTOLIC BLOOD PRESSURE: 91 MMHG | SYSTOLIC BLOOD PRESSURE: 155 MMHG

## 2019-11-30 RX ADMIN — LOSARTAN POTASSIUM SCH MG: 50 TABLET, FILM COATED ORAL at 09:50

## 2019-11-30 RX ADMIN — TAMSULOSIN HYDROCHLORIDE SCH MG: 0.4 CAPSULE ORAL at 09:50

## 2019-11-30 RX ADMIN — PANTOPRAZOLE SODIUM SCH MG: 40 TABLET, DELAYED RELEASE ORAL at 09:50

## 2019-11-30 RX ADMIN — POLYVINYL ALCOHOL SCH DROP: 14 SOLUTION/ DROPS OPHTHALMIC at 09:51

## 2019-11-30 RX ADMIN — LEVETIRACETAM SCH MG: 500 TABLET, FILM COATED ORAL at 09:50

## 2019-11-30 NOTE — DS
Physical Examination


Vital Signs: 


 Vital Signs











Temperature  97.6 F   11/30/19 06:00


 


Pulse Rate  76   11/30/19 06:00


 


Respiratory Rate  18   11/30/19 09:00


 


Blood Pressure  130/86   11/30/19 06:00


 


O2 Sat by Pulse Oximetry (%)  94 L  11/30/19 09:00











Cardiovascular: Yes: S1, S2


Respiratory: Yes: CTA Bilaterally


Gastrointestinal: Yes: Normal Bowel Sounds, Soft


Labs: 


 CBC, BMP





 11/28/19 06:20 





 11/28/19 06:20 











Discharge Summary


Problems reviewed: Yes


Reason For Visit: SYNCOPE VERTIGO


Current Active Problems





A-fib (Acute)


Dementia (Acute)


Emesis (Acute)


Neoplasm of right eye (Acute)


Syncope (Acute)


Vertigo (Acute)








Hospital Course: 


This is a 88-year-old man with a past medical history of Atrial Fibrillation, 

CAD, Lymphoma,Dementia. Who presents to the ED with family for syncopal episode 

this morning. Patient has Dementia unable to provide HPI. Per Ed record: 

Patient was in his usual state of health which includes being active and verbal 

with some baseline dementia, while seated at the table this morning for 

breakfast had increased tonicity followed by loss of consciousness for about 2 

minutes, no fall or injury, returned to alertness with some complaint of 

headache followed by vomiting, no complaints of chest pain or palpitations.  

Patient returned to baseline mental status but with notably increased fatigue 

throughout the day, brought in by family for evaluation.





Normal diet recently, no recent fevers or chills, no recent falls or 

cardiopulmonary complaints.





ED course was noted for:





(1) Head CT- no acute intracranial pathology


(2) EKG- Afib 69BPM, RBBB








seen by neurology and caradiology


started on keppra bid 


Condition: Fair





- Instructions


Referrals: 


Shay Quan MD [Staff Physician] - 1 Week (for seizure medication follow up)


Sara Saha MD [Primary Care Provider] - 1 Week


Disposition: VNS/HOME HEALTH CARE





- Home Medications


Comprehensive Discharge Medication List: 


Ambulatory Orders





Ibrutinib [Imbruvica] 140 mg PO DAILY 11/26/19 


Pantoprazole Sodium 40 mg PO DAILY 11/26/19 


Tamsulosin HCl [Flomax -] 0.4 mg PO DAILY 11/26/19 


Losartan Potassium [Cozaar -] 100 mg PO DAILY #60 tablet 11/29/19 


levETIRAcetam [Keppra -] 500 mg PO BID #30 tablet 11/29/19

## 2020-12-09 ENCOUNTER — HOSPITAL ENCOUNTER (EMERGENCY)
Dept: HOSPITAL 74 - JER | Age: 85
LOS: 1 days | Discharge: ANOTHER HOSPITAL | End: 2020-12-10
Payer: COMMERCIAL

## 2020-12-09 VITALS — BODY MASS INDEX: 25.8 KG/M2

## 2020-12-09 DIAGNOSIS — R31.9: Primary | ICD-10-CM

## 2020-12-09 LAB
ALBUMIN SERPL-MCNC: 3.4 G/DL (ref 3.4–5)
ALP SERPL-CCNC: 53 U/L (ref 45–117)
ALT SERPL-CCNC: 14 U/L (ref 13–61)
ANION GAP SERPL CALC-SCNC: 11 MMOL/L (ref 8–16)
APTT BLD: 32 SECONDS (ref 25.2–36.5)
AST SERPL-CCNC: 17 U/L (ref 15–37)
BASOPHILS # BLD: 0.3 % (ref 0–2)
BILIRUB SERPL-MCNC: 1 MG/DL (ref 0.2–1)
BILIRUB UR STRIP.AUTO-MCNC: (no result) MG/DL
BUN SERPL-MCNC: 35.6 MG/DL (ref 7–18)
CALCIUM SERPL-MCNC: 9.1 MG/DL (ref 8.5–10.1)
CHLORIDE SERPL-SCNC: 98 MMOL/L (ref 98–107)
CO2 SERPL-SCNC: 24 MMOL/L (ref 21–32)
CREAT SERPL-MCNC: 1.6 MG/DL (ref 0.55–1.3)
DEPRECATED RDW RBC AUTO: 13.6 % (ref 11.9–15.9)
EOSINOPHIL # BLD: 0 % (ref 0–4.5)
GLUCOSE SERPL-MCNC: 158 MG/DL (ref 74–106)
HCT VFR BLD CALC: 37.5 % (ref 35.4–49)
HGB BLD-MCNC: 12.4 GM/DL (ref 11.7–16.9)
INR BLD: 1.19 (ref 0.83–1.09)
KETONES UR QL STRIP: (no result)
LEUKOCYTE ESTERASE UR QL STRIP.AUTO: (no result)
LYMPHOCYTES # BLD: 10.2 % (ref 8–40)
MCH RBC QN AUTO: 33.7 PG (ref 25.7–33.7)
MCHC RBC AUTO-ENTMCNC: 33.2 G/DL (ref 32–35.9)
MCV RBC: 101.5 FL (ref 80–96)
MONOCYTES # BLD AUTO: 5 % (ref 3.8–10.2)
NEUTROPHILS # BLD: 84.5 % (ref 42.8–82.8)
PH UR: >= 9 [PH] (ref 5–8)
PLATELET # BLD AUTO: 48 K/MM3 (ref 134–434)
PMV BLD: 13.4 FL (ref 7.5–11.1)
POTASSIUM SERPLBLD-SCNC: 4.5 MMOL/L (ref 3.5–5.1)
PROT SERPL-MCNC: 8.9 G/DL (ref 6.4–8.2)
PROT UR QL STRIP: (no result)
PROT UR QL STRIP: (no result)
PT PNL PPP: 14.6 SEC (ref 9.7–13)
RBC # BLD AUTO: (no result) /UL (ref 0–23.9)
RBC # BLD AUTO: 3.69 M/MM3 (ref 4–5.6)
SODIUM SERPL-SCNC: 133 MMOL/L (ref 136–145)
SP GR UR: 1.01 (ref 1.01–1.03)
WBC # BLD AUTO: 7.9 K/MM3 (ref 4–10)

## 2020-12-09 PROCEDURE — P9034 PLATELETS, PHERESIS: HCPCS

## 2020-12-09 PROCEDURE — C9803 HOPD COVID-19 SPEC COLLECT: HCPCS

## 2020-12-09 PROCEDURE — U0003 INFECTIOUS AGENT DETECTION BY NUCLEIC ACID (DNA OR RNA); SEVERE ACUTE RESPIRATORY SYNDROME CORONAVIRUS 2 (SARS-COV-2) (CORONAVIRUS DISEASE [COVID-19]), AMPLIFIED PROBE TECHNIQUE, MAKING USE OF HIGH THROUGHPUT TECHNOLOGIES AS DESCRIBED BY CMS-2020-01-R: HCPCS

## 2020-12-10 VITALS — DIASTOLIC BLOOD PRESSURE: 100 MMHG | SYSTOLIC BLOOD PRESSURE: 153 MMHG | TEMPERATURE: 98.6 F | HEART RATE: 89 BPM

## 2020-12-10 LAB
ANION GAP SERPL CALC-SCNC: 10 MMOL/L (ref 8–16)
BUN SERPL-MCNC: 36.1 MG/DL (ref 7–18)
CALCIUM SERPL-MCNC: 9 MG/DL (ref 8.5–10.1)
CHLORIDE SERPL-SCNC: 101 MMOL/L (ref 98–107)
CO2 SERPL-SCNC: 25 MMOL/L (ref 21–32)
CREAT SERPL-MCNC: 1.5 MG/DL (ref 0.55–1.3)
DEPRECATED RDW RBC AUTO: 13.7 % (ref 11.9–15.9)
GLUCOSE SERPL-MCNC: 106 MG/DL (ref 74–106)
HCT VFR BLD CALC: 35.8 % (ref 35.4–49)
HGB BLD-MCNC: 12 GM/DL (ref 11.7–16.9)
MCH RBC QN AUTO: 34 PG (ref 25.7–33.7)
MCHC RBC AUTO-ENTMCNC: 33.5 G/DL (ref 32–35.9)
MCV RBC: 101.6 FL (ref 80–96)
PLATELET # BLD AUTO: 45 K/MM3 (ref 134–434)
PMV BLD: 13.9 FL (ref 7.5–11.1)
POTASSIUM SERPLBLD-SCNC: 4.2 MMOL/L (ref 3.5–5.1)
RBC # BLD AUTO: 3.52 M/MM3 (ref 4–5.6)
SODIUM SERPL-SCNC: 136 MMOL/L (ref 136–145)
WBC # BLD AUTO: 8.5 K/MM3 (ref 4–10)

## 2021-01-03 ENCOUNTER — HOSPITAL ENCOUNTER (INPATIENT)
Dept: HOSPITAL 74 - JER | Age: 86
LOS: 5 days | Discharge: HOME HEALTH | DRG: 723 | End: 2021-01-08
Attending: FAMILY MEDICINE | Admitting: FAMILY MEDICINE
Payer: COMMERCIAL

## 2021-01-03 VITALS — BODY MASS INDEX: 24.3 KG/M2

## 2021-01-03 DIAGNOSIS — I25.10: ICD-10-CM

## 2021-01-03 DIAGNOSIS — N40.0: ICD-10-CM

## 2021-01-03 DIAGNOSIS — D49.89: ICD-10-CM

## 2021-01-03 DIAGNOSIS — F03.90: ICD-10-CM

## 2021-01-03 DIAGNOSIS — C61: Primary | ICD-10-CM

## 2021-01-03 DIAGNOSIS — I71.9: ICD-10-CM

## 2021-01-03 DIAGNOSIS — D50.0: ICD-10-CM

## 2021-01-03 DIAGNOSIS — C91.10: ICD-10-CM

## 2021-01-03 DIAGNOSIS — I10: ICD-10-CM

## 2021-01-03 DIAGNOSIS — D61.818: ICD-10-CM

## 2021-01-03 DIAGNOSIS — R31.0: ICD-10-CM

## 2021-01-03 DIAGNOSIS — I48.91: ICD-10-CM

## 2021-01-03 LAB
ALBUMIN SERPL-MCNC: 2.4 G/DL (ref 3.4–5)
ALP SERPL-CCNC: 46 U/L (ref 45–117)
ALT SERPL-CCNC: 10 U/L (ref 13–61)
ANION GAP SERPL CALC-SCNC: 5 MMOL/L (ref 8–16)
APPEARANCE UR: (no result)
AST SERPL-CCNC: 13 U/L (ref 15–37)
BASOPHILS # BLD: 0.4 % (ref 0–2)
BILIRUB SERPL-MCNC: 0.3 MG/DL (ref 0.2–1)
BUN SERPL-MCNC: 21.3 MG/DL (ref 7–18)
CALCIUM SERPL-MCNC: 8.5 MG/DL (ref 8.5–10.1)
CHLORIDE SERPL-SCNC: 104 MMOL/L (ref 98–107)
CO2 SERPL-SCNC: 29 MMOL/L (ref 21–32)
COLOR UR: (no result)
CREAT SERPL-MCNC: 1.1 MG/DL (ref 0.55–1.3)
DEPRECATED RDW RBC AUTO: 14.2 % (ref 11.9–15.9)
DEPRECATED RDW RBC AUTO: 14.4 % (ref 11.9–15.9)
EOSINOPHIL # BLD: 1.1 % (ref 0–4.5)
GLUCOSE SERPL-MCNC: 90 MG/DL (ref 74–106)
HCT VFR BLD CALC: 24.4 % (ref 35.4–49)
HCT VFR BLD CALC: 25.5 % (ref 35.4–49)
HGB BLD-MCNC: 8.2 GM/DL (ref 11.7–16.9)
HGB BLD-MCNC: 8.6 GM/DL (ref 11.7–16.9)
LYMPHOCYTES # BLD: 34.7 % (ref 8–40)
MCH RBC QN AUTO: 33.5 PG (ref 25.7–33.7)
MCH RBC QN AUTO: 33.5 PG (ref 25.7–33.7)
MCHC RBC AUTO-ENTMCNC: 33.5 G/DL (ref 32–35.9)
MCHC RBC AUTO-ENTMCNC: 33.7 G/DL (ref 32–35.9)
MCV RBC: 99.5 FL (ref 80–96)
MCV RBC: 99.8 FL (ref 80–96)
MONOCYTES # BLD AUTO: 7.7 % (ref 3.8–10.2)
NEUTROPHILS # BLD: 56.1 % (ref 42.8–82.8)
PLATELET # BLD AUTO: 65 K/MM3 (ref 134–434)
PLATELET # BLD AUTO: 67 K/MM3 (ref 134–434)
PMV BLD: 10.1 FL (ref 7.5–11.1)
PMV BLD: 9.9 FL (ref 7.5–11.1)
PROT SERPL-MCNC: 9.1 G/DL (ref 6.4–8.2)
RBC # BLD AUTO: 2.45 M/MM3 (ref 4–5.6)
RBC # BLD AUTO: 2.56 M/MM3 (ref 4–5.6)
SODIUM SERPL-SCNC: 138 MMOL/L (ref 136–145)
WBC # BLD AUTO: 2.9 K/MM3 (ref 4–10)
WBC # BLD AUTO: 2.9 K/MM3 (ref 4–10)

## 2021-01-03 PROCEDURE — U0003 INFECTIOUS AGENT DETECTION BY NUCLEIC ACID (DNA OR RNA); SEVERE ACUTE RESPIRATORY SYNDROME CORONAVIRUS 2 (SARS-COV-2) (CORONAVIRUS DISEASE [COVID-19]), AMPLIFIED PROBE TECHNIQUE, MAKING USE OF HIGH THROUGHPUT TECHNOLOGIES AS DESCRIBED BY CMS-2020-01-R: HCPCS

## 2021-01-03 PROCEDURE — P9034 PLATELETS, PHERESIS: HCPCS

## 2021-01-03 PROCEDURE — C9803 HOPD COVID-19 SPEC COLLECT: HCPCS

## 2021-01-03 PROCEDURE — C1769 GUIDE WIRE: HCPCS

## 2021-01-03 PROCEDURE — C1729 CATH, DRAINAGE: HCPCS

## 2021-01-03 RX ADMIN — LOSARTAN POTASSIUM SCH MG: 50 TABLET, FILM COATED ORAL at 10:11

## 2021-01-03 RX ADMIN — LEVETIRACETAM SCH MG: 500 TABLET, FILM COATED ORAL at 10:11

## 2021-01-03 RX ADMIN — LEVETIRACETAM SCH MG: 500 TABLET, FILM COATED ORAL at 22:03

## 2021-01-03 RX ADMIN — ACETAMINOPHEN PRN MG: 325 TABLET ORAL at 20:53

## 2021-01-03 RX ADMIN — DEXTROSE AND SODIUM CHLORIDE SCH MLS/HR: 5; 900 INJECTION, SOLUTION INTRAVENOUS at 12:07

## 2021-01-03 RX ADMIN — ACETAMINOPHEN PRN MG: 325 TABLET ORAL at 10:12

## 2021-01-04 LAB
ALBUMIN SERPL-MCNC: 2.6 G/DL (ref 3.4–5)
ALP SERPL-CCNC: 50 U/L (ref 45–117)
ALT SERPL-CCNC: 12 U/L (ref 13–61)
ANION GAP SERPL CALC-SCNC: 6 MMOL/L (ref 8–16)
AST SERPL-CCNC: 15 U/L (ref 15–37)
BASOPHILS # BLD: 0.4 % (ref 0–2)
BILIRUB SERPL-MCNC: 0.4 MG/DL (ref 0.2–1)
BUN SERPL-MCNC: 18.6 MG/DL (ref 7–18)
CALCIUM SERPL-MCNC: 8.8 MG/DL (ref 8.5–10.1)
CHLORIDE SERPL-SCNC: 104 MMOL/L (ref 98–107)
CO2 SERPL-SCNC: 28 MMOL/L (ref 21–32)
CREAT SERPL-MCNC: 1 MG/DL (ref 0.55–1.3)
DEPRECATED RDW RBC AUTO: 14.4 % (ref 11.9–15.9)
EOSINOPHIL # BLD: 0.1 % (ref 0–4.5)
GLUCOSE SERPL-MCNC: 139 MG/DL (ref 74–106)
HCT VFR BLD CALC: 25.2 % (ref 35.4–49)
HGB BLD-MCNC: 8.4 GM/DL (ref 11.7–16.9)
LYMPHOCYTES # BLD: 19.3 % (ref 8–40)
MCH RBC QN AUTO: 33.3 PG (ref 25.7–33.7)
MCHC RBC AUTO-ENTMCNC: 33.5 G/DL (ref 32–35.9)
MCV RBC: 99.4 FL (ref 80–96)
MONOCYTES # BLD AUTO: 5.2 % (ref 3.8–10.2)
NEUTROPHILS # BLD: 75 % (ref 42.8–82.8)
PLATELET # BLD AUTO: 66 K/MM3 (ref 134–434)
PMV BLD: 9.9 FL (ref 7.5–11.1)
PROT SERPL-MCNC: 9.6 G/DL (ref 6.4–8.2)
RBC # BLD AUTO: 2.53 M/MM3 (ref 4–5.6)
SODIUM SERPL-SCNC: 138 MMOL/L (ref 136–145)
WBC # BLD AUTO: 3.4 K/MM3 (ref 4–10)

## 2021-01-04 RX ADMIN — LEVETIRACETAM SCH MG: 500 TABLET, FILM COATED ORAL at 22:33

## 2021-01-04 RX ADMIN — LEVETIRACETAM SCH MG: 500 TABLET, FILM COATED ORAL at 12:42

## 2021-01-04 RX ADMIN — ACETAMINOPHEN PRN MG: 325 TABLET ORAL at 22:33

## 2021-01-04 RX ADMIN — TAMSULOSIN HYDROCHLORIDE SCH MG: 0.4 CAPSULE ORAL at 12:42

## 2021-01-04 RX ADMIN — DEXTROSE AND SODIUM CHLORIDE SCH MLS/HR: 5; 900 INJECTION, SOLUTION INTRAVENOUS at 13:11

## 2021-01-04 RX ADMIN — LOSARTAN POTASSIUM SCH MG: 50 TABLET, FILM COATED ORAL at 12:42

## 2021-01-05 LAB
ALBUMIN SERPL-MCNC: 2.5 G/DL (ref 3.4–5)
ALP SERPL-CCNC: 49 U/L (ref 45–117)
ALT SERPL-CCNC: 14 U/L (ref 13–61)
ANION GAP SERPL CALC-SCNC: 5 MMOL/L (ref 8–16)
AST SERPL-CCNC: 19 U/L (ref 15–37)
BASOPHILS # BLD: 0.4 % (ref 0–2)
BILIRUB SERPL-MCNC: 0.4 MG/DL (ref 0.2–1)
BUN SERPL-MCNC: 14.2 MG/DL (ref 7–18)
CALCIUM SERPL-MCNC: 8.4 MG/DL (ref 8.5–10.1)
CHLORIDE SERPL-SCNC: 107 MMOL/L (ref 98–107)
CO2 SERPL-SCNC: 26 MMOL/L (ref 21–32)
CREAT SERPL-MCNC: 0.9 MG/DL (ref 0.55–1.3)
DEPRECATED RDW RBC AUTO: 14.5 % (ref 11.9–15.9)
EOSINOPHIL # BLD: 0.4 % (ref 0–4.5)
GLUCOSE SERPL-MCNC: 98 MG/DL (ref 74–106)
HCT VFR BLD CALC: 23.5 % (ref 35.4–49)
HGB BLD-MCNC: 8 GM/DL (ref 11.7–16.9)
LYMPHOCYTES # BLD: 27.9 % (ref 8–40)
MCH RBC QN AUTO: 33.8 PG (ref 25.7–33.7)
MCHC RBC AUTO-ENTMCNC: 33.9 G/DL (ref 32–35.9)
MCV RBC: 99.8 FL (ref 80–96)
MONOCYTES # BLD AUTO: 8.5 % (ref 3.8–10.2)
NEUTROPHILS # BLD: 62.8 % (ref 42.8–82.8)
PLATELET # BLD AUTO: 65 K/MM3 (ref 134–434)
PMV BLD: 9.9 FL (ref 7.5–11.1)
PROT SERPL-MCNC: 9.3 G/DL (ref 6.4–8.2)
RBC # BLD AUTO: 2.35 M/MM3 (ref 4–5.6)
SODIUM SERPL-SCNC: 137 MMOL/L (ref 136–145)
WBC # BLD AUTO: 3.3 K/MM3 (ref 4–10)

## 2021-01-05 PROCEDURE — 30233R1 TRANSFUSION OF NONAUTOLOGOUS PLATELETS INTO PERIPHERAL VEIN, PERCUTANEOUS APPROACH: ICD-10-PCS | Performed by: NURSE PRACTITIONER

## 2021-01-05 RX ADMIN — DEXTROSE AND SODIUM CHLORIDE SCH: 5; 900 INJECTION, SOLUTION INTRAVENOUS at 16:39

## 2021-01-05 RX ADMIN — TAMSULOSIN HYDROCHLORIDE SCH MG: 0.4 CAPSULE ORAL at 08:45

## 2021-01-05 RX ADMIN — ACETAMINOPHEN PRN MG: 325 TABLET ORAL at 10:36

## 2021-01-05 RX ADMIN — DEXTROSE AND SODIUM CHLORIDE SCH MLS/HR: 5; 900 INJECTION, SOLUTION INTRAVENOUS at 07:56

## 2021-01-05 RX ADMIN — LEVETIRACETAM SCH MG: 500 TABLET, FILM COATED ORAL at 21:49

## 2021-01-05 RX ADMIN — LEVETIRACETAM SCH MG: 500 TABLET, FILM COATED ORAL at 10:31

## 2021-01-05 RX ADMIN — LOSARTAN POTASSIUM SCH MG: 50 TABLET, FILM COATED ORAL at 10:31

## 2021-01-06 LAB
ALBUMIN SERPL-MCNC: 2.5 G/DL (ref 3.4–5)
ALP SERPL-CCNC: 50 U/L (ref 45–117)
ALT SERPL-CCNC: 17 U/L (ref 13–61)
ANION GAP SERPL CALC-SCNC: 5 MMOL/L (ref 8–16)
AST SERPL-CCNC: 20 U/L (ref 15–37)
BASOPHILS # BLD: 0.6 % (ref 0–2)
BILIRUB SERPL-MCNC: 0.5 MG/DL (ref 0.2–1)
BUN SERPL-MCNC: 14.7 MG/DL (ref 7–18)
CALCIUM SERPL-MCNC: 8.3 MG/DL (ref 8.5–10.1)
CHLORIDE SERPL-SCNC: 108 MMOL/L (ref 98–107)
CO2 SERPL-SCNC: 26 MMOL/L (ref 21–32)
CREAT SERPL-MCNC: 1.1 MG/DL (ref 0.55–1.3)
DEPRECATED RDW RBC AUTO: 14.8 % (ref 11.9–15.9)
EOSINOPHIL # BLD: 1 % (ref 0–4.5)
GLUCOSE SERPL-MCNC: 86 MG/DL (ref 74–106)
HCT VFR BLD CALC: 23.7 % (ref 35.4–49)
HGB BLD-MCNC: 7.8 GM/DL (ref 11.7–16.9)
INR BLD: 1.25 (ref 0.83–1.09)
IRON SERPL-MCNC: 90 UG/DL (ref 50–175)
LYMPHOCYTES # BLD: 30.5 % (ref 8–40)
MCH RBC QN AUTO: 33.4 PG (ref 25.7–33.7)
MCHC RBC AUTO-ENTMCNC: 32.9 G/DL (ref 32–35.9)
MCV RBC: 101.5 FL (ref 80–96)
MONOCYTES # BLD AUTO: 8.2 % (ref 3.8–10.2)
NEUTROPHILS # BLD: 59.7 % (ref 42.8–82.8)
PLATELET # BLD AUTO: 79 K/MM3 (ref 134–434)
PMV BLD: 10 FL (ref 7.5–11.1)
PROT SERPL-MCNC: 9.4 G/DL (ref 6.4–8.2)
PT PNL PPP: 15.3 SEC (ref 9.7–13)
RBC # BLD AUTO: 2.34 M/MM3 (ref 4–5.6)
SODIUM SERPL-SCNC: 139 MMOL/L (ref 136–145)
TIBC SERPL-MCNC: 184 UG/DL (ref 250–450)
WBC # BLD AUTO: 3 K/MM3 (ref 4–10)

## 2021-01-06 RX ADMIN — DEXTROSE AND SODIUM CHLORIDE SCH MLS/HR: 5; 900 INJECTION, SOLUTION INTRAVENOUS at 11:58

## 2021-01-06 RX ADMIN — LEVETIRACETAM SCH MG: 500 TABLET, FILM COATED ORAL at 22:15

## 2021-01-06 RX ADMIN — TAMSULOSIN HYDROCHLORIDE SCH MG: 0.4 CAPSULE ORAL at 09:10

## 2021-01-06 RX ADMIN — LOSARTAN POTASSIUM SCH MG: 50 TABLET, FILM COATED ORAL at 09:09

## 2021-01-06 RX ADMIN — LEVETIRACETAM SCH MG: 500 TABLET, FILM COATED ORAL at 09:10

## 2021-01-07 RX ADMIN — ACETAMINOPHEN PRN MG: 325 TABLET ORAL at 16:29

## 2021-01-07 RX ADMIN — LEVETIRACETAM SCH MG: 500 TABLET, FILM COATED ORAL at 09:00

## 2021-01-07 RX ADMIN — CYANOCOBALAMIN SCH MCG: 1000 INJECTION, SOLUTION INTRAMUSCULAR at 13:17

## 2021-01-07 RX ADMIN — LOSARTAN POTASSIUM SCH MG: 50 TABLET, FILM COATED ORAL at 08:59

## 2021-01-07 RX ADMIN — TAMSULOSIN HYDROCHLORIDE SCH MG: 0.4 CAPSULE ORAL at 09:00

## 2021-01-07 RX ADMIN — LEVETIRACETAM SCH MG: 500 TABLET, FILM COATED ORAL at 21:17

## 2021-01-07 RX ADMIN — ACETAMINOPHEN PRN MG: 325 TABLET ORAL at 21:22

## 2021-01-07 RX ADMIN — DEXTROSE AND SODIUM CHLORIDE SCH MLS/HR: 5; 900 INJECTION, SOLUTION INTRAVENOUS at 16:31

## 2021-01-08 VITALS — DIASTOLIC BLOOD PRESSURE: 99 MMHG | TEMPERATURE: 97.7 F | SYSTOLIC BLOOD PRESSURE: 146 MMHG | HEART RATE: 76 BPM

## 2021-01-08 LAB
ALBUMIN SERPL-MCNC: 2.5 G/DL (ref 3.4–5)
ALP SERPL-CCNC: 53 U/L (ref 45–117)
ALT SERPL-CCNC: 21 U/L (ref 13–61)
ANION GAP SERPL CALC-SCNC: 5 MMOL/L (ref 8–16)
AST SERPL-CCNC: 19 U/L (ref 15–37)
BASOPHILS # BLD: 0.5 % (ref 0–2)
BILIRUB SERPL-MCNC: 1.1 MG/DL (ref 0.2–1)
BUN SERPL-MCNC: 15.6 MG/DL (ref 7–18)
CALCIUM SERPL-MCNC: 8.4 MG/DL (ref 8.5–10.1)
CHLORIDE SERPL-SCNC: 107 MMOL/L (ref 98–107)
CO2 SERPL-SCNC: 26 MMOL/L (ref 21–32)
CREAT SERPL-MCNC: 1 MG/DL (ref 0.55–1.3)
DEPRECATED RDW RBC AUTO: 15 % (ref 11.9–15.9)
EOSINOPHIL # BLD: 1.3 % (ref 0–4.5)
GLUCOSE SERPL-MCNC: 72 MG/DL (ref 74–106)
HCT VFR BLD CALC: 23.2 % (ref 35.4–49)
HGB BLD-MCNC: 7.8 GM/DL (ref 11.7–16.9)
LYMPHOCYTES # BLD: 27.7 % (ref 8–40)
MCH RBC QN AUTO: 33.7 PG (ref 25.7–33.7)
MCHC RBC AUTO-ENTMCNC: 33.5 G/DL (ref 32–35.9)
MCV RBC: 100.6 FL (ref 80–96)
MONOCYTES # BLD AUTO: 6.1 % (ref 3.8–10.2)
NEUTROPHILS # BLD: 64.4 % (ref 42.8–82.8)
PLATELET # BLD AUTO: 69 K/MM3 (ref 134–434)
PMV BLD: 10.6 FL (ref 7.5–11.1)
PROT SERPL-MCNC: 9.7 G/DL (ref 6.4–8.2)
RBC # BLD AUTO: 2.31 M/MM3 (ref 4–5.6)
SODIUM SERPL-SCNC: 138 MMOL/L (ref 136–145)
WBC # BLD AUTO: 3 K/MM3 (ref 4–10)

## 2021-01-08 RX ADMIN — LOSARTAN POTASSIUM SCH MG: 50 TABLET, FILM COATED ORAL at 09:04

## 2021-01-08 RX ADMIN — ACETAMINOPHEN PRN MG: 10 INJECTION, SOLUTION INTRAVENOUS at 15:08

## 2021-01-08 RX ADMIN — ACETAMINOPHEN PRN MG: 10 INJECTION, SOLUTION INTRAVENOUS at 01:53

## 2021-01-08 RX ADMIN — LEVETIRACETAM SCH MG: 500 TABLET, FILM COATED ORAL at 09:04

## 2021-01-08 RX ADMIN — CYANOCOBALAMIN SCH MCG: 1000 INJECTION, SOLUTION INTRAMUSCULAR at 09:04

## 2021-01-08 RX ADMIN — TAMSULOSIN HYDROCHLORIDE SCH MG: 0.4 CAPSULE ORAL at 09:04

## 2021-01-08 RX ADMIN — DEXTROSE AND SODIUM CHLORIDE SCH MLS/HR: 5; 900 INJECTION, SOLUTION INTRAVENOUS at 09:04

## 2021-01-28 ENCOUNTER — HOSPITAL ENCOUNTER (OUTPATIENT)
Dept: HOSPITAL 74 - JONCBLOOD | Age: 86
Discharge: HOME | End: 2021-01-28
Attending: INTERNAL MEDICINE
Payer: COMMERCIAL

## 2021-01-28 VITALS — HEART RATE: 67 BPM | SYSTOLIC BLOOD PRESSURE: 145 MMHG | TEMPERATURE: 98 F | DIASTOLIC BLOOD PRESSURE: 69 MMHG

## 2021-01-28 DIAGNOSIS — D69.6: ICD-10-CM

## 2021-01-28 DIAGNOSIS — C91.10: Primary | ICD-10-CM

## 2021-01-28 LAB
ALBUMIN SERPL-MCNC: 2.5 G/DL (ref 3.4–5)
ALP SERPL-CCNC: 58 U/L (ref 45–117)
ALT SERPL-CCNC: 13 U/L (ref 13–61)
ANION GAP SERPL CALC-SCNC: 5 MMOL/L (ref 8–16)
AST SERPL-CCNC: 12 U/L (ref 15–37)
BASOPHILS # BLD: 0.2 % (ref 0–2)
BILIRUB DIRECT SERPL-MCNC: 132 U/L (ref 87–246)
BILIRUB SERPL-MCNC: 0.9 MG/DL (ref 0.2–1)
BUN SERPL-MCNC: 23.3 MG/DL (ref 7–18)
CALCIUM SERPL-MCNC: 8.6 MG/DL (ref 8.5–10.1)
CHLORIDE SERPL-SCNC: 105 MMOL/L (ref 98–107)
CO2 SERPL-SCNC: 27 MMOL/L (ref 21–32)
CREAT SERPL-MCNC: 1.2 MG/DL (ref 0.55–1.3)
DEPRECATED RDW RBC AUTO: 17 % (ref 11.9–15.9)
EOSINOPHIL # BLD: 0.4 % (ref 0–4.5)
GLUCOSE SERPL-MCNC: 112 MG/DL (ref 74–106)
HCT VFR BLD CALC: 21.3 % (ref 35.4–49)
HGB BLD-MCNC: 7.2 GM/DL (ref 11.7–16.9)
LYMPHOCYTES # BLD: 55.3 % (ref 8–40)
MAGNESIUM SERPL-MCNC: 2 MG/DL (ref 1.8–2.4)
MCH RBC QN AUTO: 33.4 PG (ref 25.7–33.7)
MCHC RBC AUTO-ENTMCNC: 33.6 G/DL (ref 32–35.9)
MCV RBC: 99.4 FL (ref 80–96)
MONOCYTES # BLD AUTO: 8 % (ref 3.8–10.2)
NEUTROPHILS # BLD: 36.1 % (ref 42.8–82.8)
PLATELET # BLD AUTO: 58 K/MM3 (ref 134–434)
PMV BLD: 9.8 FL (ref 7.5–11.1)
POTASSIUM SERPLBLD-SCNC: 3.9 MMOL/L (ref 3.5–5.1)
PROT SERPL-MCNC: 11.3 G/DL (ref 6.4–8.2)
RBC # BLD AUTO: 2.15 M/MM3 (ref 4–5.6)
SODIUM SERPL-SCNC: 137 MMOL/L (ref 136–145)
URATE SERPL-SCNC: 6.1 MG/DL (ref 2.6–7.2)
WBC # BLD AUTO: 3.2 K/MM3 (ref 4–10)

## 2021-01-28 PROCEDURE — 30233N1 TRANSFUSION OF NONAUTOLOGOUS RED BLOOD CELLS INTO PERIPHERAL VEIN, PERCUTANEOUS APPROACH: ICD-10-PCS | Performed by: INTERNAL MEDICINE

## 2021-01-28 PROCEDURE — P9058 RBC, L/R, CMV-NEG, IRRAD: HCPCS
